# Patient Record
Sex: FEMALE | Race: OTHER | HISPANIC OR LATINO
[De-identification: names, ages, dates, MRNs, and addresses within clinical notes are randomized per-mention and may not be internally consistent; named-entity substitution may affect disease eponyms.]

---

## 2017-01-19 ENCOUNTER — APPOINTMENT (OUTPATIENT)
Dept: PULMONOLOGY | Facility: CLINIC | Age: 42
End: 2017-01-19

## 2017-01-20 ENCOUNTER — APPOINTMENT (OUTPATIENT)
Dept: PULMONOLOGY | Facility: CLINIC | Age: 42
End: 2017-01-20

## 2017-01-20 VITALS
SYSTOLIC BLOOD PRESSURE: 130 MMHG | DIASTOLIC BLOOD PRESSURE: 80 MMHG | BODY MASS INDEX: 33.46 KG/M2 | HEART RATE: 76 BPM | HEIGHT: 64 IN | WEIGHT: 196 LBS | OXYGEN SATURATION: 97 %

## 2017-01-20 DIAGNOSIS — J30.9 ALLERGIC RHINITIS, UNSPECIFIED: ICD-10-CM

## 2017-06-06 ENCOUNTER — APPOINTMENT (OUTPATIENT)
Dept: PULMONOLOGY | Facility: CLINIC | Age: 42
End: 2017-06-06

## 2017-12-29 ENCOUNTER — CHART COPY (OUTPATIENT)
Age: 42
End: 2017-12-29

## 2018-09-27 ENCOUNTER — APPOINTMENT (OUTPATIENT)
Dept: PULMONOLOGY | Facility: CLINIC | Age: 43
End: 2018-09-27
Payer: COMMERCIAL

## 2018-09-27 VITALS
BODY MASS INDEX: 30.55 KG/M2 | OXYGEN SATURATION: 99 % | DIASTOLIC BLOOD PRESSURE: 80 MMHG | SYSTOLIC BLOOD PRESSURE: 120 MMHG | WEIGHT: 178 LBS | HEART RATE: 80 BPM

## 2018-09-27 PROCEDURE — 94664 DEMO&/EVAL PT USE INHALER: CPT | Mod: 59

## 2018-09-27 PROCEDURE — 99215 OFFICE O/P EST HI 40 MIN: CPT | Mod: 25

## 2018-09-27 PROCEDURE — 94060 EVALUATION OF WHEEZING: CPT

## 2018-11-12 ENCOUNTER — APPOINTMENT (OUTPATIENT)
Dept: PULMONOLOGY | Facility: CLINIC | Age: 43
End: 2018-11-12
Payer: COMMERCIAL

## 2018-11-12 VITALS
HEART RATE: 73 BPM | HEIGHT: 64 IN | OXYGEN SATURATION: 99 % | BODY MASS INDEX: 30.39 KG/M2 | DIASTOLIC BLOOD PRESSURE: 70 MMHG | WEIGHT: 178 LBS | SYSTOLIC BLOOD PRESSURE: 130 MMHG

## 2018-11-12 PROCEDURE — 99214 OFFICE O/P EST MOD 30 MIN: CPT

## 2019-03-25 ENCOUNTER — APPOINTMENT (OUTPATIENT)
Dept: PULMONOLOGY | Facility: CLINIC | Age: 44
End: 2019-03-25

## 2019-05-14 ENCOUNTER — TRANSCRIPTION ENCOUNTER (OUTPATIENT)
Age: 44
End: 2019-05-14

## 2019-05-14 ENCOUNTER — OUTPATIENT (OUTPATIENT)
Dept: OUTPATIENT SERVICES | Facility: HOSPITAL | Age: 44
LOS: 1 days | End: 2019-05-14
Payer: COMMERCIAL

## 2019-05-14 VITALS
SYSTOLIC BLOOD PRESSURE: 140 MMHG | RESPIRATION RATE: 15 BRPM | HEART RATE: 65 BPM | DIASTOLIC BLOOD PRESSURE: 67 MMHG | OXYGEN SATURATION: 98 %

## 2019-05-14 VITALS
RESPIRATION RATE: 16 BRPM | HEIGHT: 63 IN | DIASTOLIC BLOOD PRESSURE: 60 MMHG | SYSTOLIC BLOOD PRESSURE: 124 MMHG | TEMPERATURE: 98 F | OXYGEN SATURATION: 98 % | WEIGHT: 179.9 LBS | HEART RATE: 69 BPM

## 2019-05-14 DIAGNOSIS — Z98.891 HISTORY OF UTERINE SCAR FROM PREVIOUS SURGERY: Chronic | ICD-10-CM

## 2019-05-14 DIAGNOSIS — R94.39 ABNORMAL RESULT OF OTHER CARDIOVASCULAR FUNCTION STUDY: ICD-10-CM

## 2019-05-14 DIAGNOSIS — Z98.890 OTHER SPECIFIED POSTPROCEDURAL STATES: Chronic | ICD-10-CM

## 2019-05-14 LAB
ANION GAP SERPL CALC-SCNC: 12 MMOL/L — SIGNIFICANT CHANGE UP (ref 5–17)
APTT BLD: 28.2 SEC — SIGNIFICANT CHANGE UP (ref 27.5–36.3)
BUN SERPL-MCNC: 9 MG/DL — SIGNIFICANT CHANGE UP (ref 8–20)
CALCIUM SERPL-MCNC: 9.1 MG/DL — SIGNIFICANT CHANGE UP (ref 8.6–10.2)
CHLORIDE SERPL-SCNC: 104 MMOL/L — SIGNIFICANT CHANGE UP (ref 98–107)
CO2 SERPL-SCNC: 24 MMOL/L — SIGNIFICANT CHANGE UP (ref 22–29)
CREAT SERPL-MCNC: 0.51 MG/DL — SIGNIFICANT CHANGE UP (ref 0.5–1.3)
GLUCOSE SERPL-MCNC: 111 MG/DL — SIGNIFICANT CHANGE UP (ref 70–115)
HCG SERPL-ACNC: <4 MIU/ML — SIGNIFICANT CHANGE UP
HCT VFR BLD CALC: 39.5 % — SIGNIFICANT CHANGE UP (ref 37–47)
HGB BLD-MCNC: 12.9 G/DL — SIGNIFICANT CHANGE UP (ref 12–16)
INR BLD: 0.98 RATIO — SIGNIFICANT CHANGE UP (ref 0.88–1.16)
MCHC RBC-ENTMCNC: 28.5 PG — SIGNIFICANT CHANGE UP (ref 27–31)
MCHC RBC-ENTMCNC: 32.7 G/DL — SIGNIFICANT CHANGE UP (ref 32–36)
MCV RBC AUTO: 87.4 FL — SIGNIFICANT CHANGE UP (ref 81–99)
PLATELET # BLD AUTO: 212 K/UL — SIGNIFICANT CHANGE UP (ref 150–400)
POTASSIUM SERPL-MCNC: 4 MMOL/L — SIGNIFICANT CHANGE UP (ref 3.5–5.3)
POTASSIUM SERPL-SCNC: 4 MMOL/L — SIGNIFICANT CHANGE UP (ref 3.5–5.3)
PROTHROM AB SERPL-ACNC: 11.3 SEC — SIGNIFICANT CHANGE UP (ref 10–12.9)
RBC # BLD: 4.52 M/UL — SIGNIFICANT CHANGE UP (ref 4.4–5.2)
RBC # FLD: 12.5 % — SIGNIFICANT CHANGE UP (ref 11–15.6)
SODIUM SERPL-SCNC: 140 MMOL/L — SIGNIFICANT CHANGE UP (ref 135–145)
WBC # BLD: 3 K/UL — LOW (ref 4.8–10.8)
WBC # FLD AUTO: 3 K/UL — LOW (ref 4.8–10.8)

## 2019-05-14 PROCEDURE — 36415 COLL VENOUS BLD VENIPUNCTURE: CPT

## 2019-05-14 PROCEDURE — C1894: CPT

## 2019-05-14 PROCEDURE — C1760: CPT

## 2019-05-14 PROCEDURE — C1887: CPT

## 2019-05-14 PROCEDURE — 80048 BASIC METABOLIC PNL TOTAL CA: CPT

## 2019-05-14 PROCEDURE — 84702 CHORIONIC GONADOTROPIN TEST: CPT

## 2019-05-14 PROCEDURE — 93458 L HRT ARTERY/VENTRICLE ANGIO: CPT

## 2019-05-14 PROCEDURE — 93005 ELECTROCARDIOGRAM TRACING: CPT

## 2019-05-14 PROCEDURE — 85610 PROTHROMBIN TIME: CPT

## 2019-05-14 PROCEDURE — 99152 MOD SED SAME PHYS/QHP 5/>YRS: CPT

## 2019-05-14 PROCEDURE — 99153 MOD SED SAME PHYS/QHP EA: CPT

## 2019-05-14 PROCEDURE — 85027 COMPLETE CBC AUTOMATED: CPT

## 2019-05-14 PROCEDURE — 93010 ELECTROCARDIOGRAM REPORT: CPT

## 2019-05-14 PROCEDURE — 85730 THROMBOPLASTIN TIME PARTIAL: CPT

## 2019-05-14 PROCEDURE — C1769: CPT

## 2019-05-14 RX ORDER — SODIUM CHLORIDE 9 MG/ML
1000 INJECTION INTRAMUSCULAR; INTRAVENOUS; SUBCUTANEOUS
Refills: 0 | Status: DISCONTINUED | OUTPATIENT
Start: 2019-05-14 | End: 2019-05-29

## 2019-05-14 RX ORDER — TOPIRAMATE 25 MG
0 TABLET ORAL
Qty: 0 | Refills: 0 | DISCHARGE

## 2019-05-14 RX ORDER — ESCITALOPRAM OXALATE 10 MG/1
0 TABLET, FILM COATED ORAL
Qty: 0 | Refills: 0 | DISCHARGE

## 2019-05-14 RX ORDER — ASPIRIN/CALCIUM CARB/MAGNESIUM 324 MG
81 TABLET ORAL ONCE
Refills: 0 | Status: COMPLETED | OUTPATIENT
Start: 2019-05-14 | End: 2019-05-14

## 2019-05-14 RX ORDER — ACETAMINOPHEN 500 MG
1000 TABLET ORAL ONCE
Refills: 0 | Status: COMPLETED | OUTPATIENT
Start: 2019-05-14 | End: 2019-05-14

## 2019-05-14 RX ADMIN — Medication 1000 MILLIGRAM(S): at 16:10

## 2019-05-14 RX ADMIN — Medication 81 MILLIGRAM(S): at 13:54

## 2019-05-14 RX ADMIN — SODIUM CHLORIDE 30 MILLILITER(S): 9 INJECTION INTRAMUSCULAR; INTRAVENOUS; SUBCUTANEOUS at 10:54

## 2019-05-14 RX ADMIN — Medication 400 MILLIGRAM(S): at 15:34

## 2019-05-14 NOTE — H&P PST ADULT - HISTORY OF PRESENT ILLNESS
Ms Valentino is a 44 yo Guatemalan speaking female who has been complaining of recurrent pa;pitations.  Her palpitations occur 3 times per week and last for a few seconds up to 3 times per day.  Patient's palpitations are associated with symptoms of left sided chest pain.  She has an additional complaint of chest pressure like sensation that localizes to left chest.  Chest pain is at rest and with exertion.  Nuclear Stress test 2/26/19 showed small mild basal anterior wall ischemia, with no  antecedant myocardial infarction.  EF 79%.  ECHO 2/7/19 ef 65%  MILD mr, TRACE tr.  Holter monitor HR  Moderate/frequent APC's. SLeep study 2/7/19 Mild to moderate RONDA with minimal hypoxia.

## 2019-05-14 NOTE — DISCHARGE NOTE NURSING/CASE MANAGEMENT/SOCIAL WORK - NSDCDPATPORTLINK_GEN_ALL_CORE
You can access the [facility] Patient Portal, offered by [facility], by registering with the following website: [email or web address]

## 2019-05-14 NOTE — DISCHARGE NOTE PROVIDER - NSDCACTIVITY_GEN_ALL_CORE
Do not make important decisions/Stairs allowed/Walking - Indoors allowed/Showering allowed/Do not drive or operate machinery/Walking - Outdoors allowed/Sex allowed

## 2019-05-14 NOTE — CONSULT NOTE ADULT - SUBJECTIVE AND OBJECTIVE BOX
Patient is a 43y old  Female who presents with a chief complaint of Chest pain      HPI:  Ms Valentino is a 42 yo Chadian speaking female who has been complaining of recurrent palpitations.  Her palpitations occur 3 times per week and last for a few seconds up to 3 times per day.  Patient's palpitations are associated with symptoms of left sided chest pain.  She has an additional complaint of chest pressure like sensation that localizes to left chest.  Chest pain is at rest and with exertion.  Nuclear Stress test 19 showed small mild basal anterior wall ischemia, with no  antecedant myocardial infarction.  EF 79%.  ECHO 19 ef 65%  MILD mr, TRACE tr.  Holter monitor HR  Moderate/frequent APC's. SLeep study 19 Mild to moderate RONDA with minimal hypoxia.       PAST MEDICAL & SURGICAL HISTORY:  PFO (patent foramen ovale): diagnosed   RONDA (obstructive sleep apnea): mild to moderate  Hypertension  Asthma  Paroxysmal atrial tachycardia  Paroxysmal SVT (supraventricular tachycardia)  Atypical chest pain  Palpitations  H/O knee surgery: left knee  H/O: : X 2        Allergies    No Known Allergies    Intolerances        MEDICATIONS  (STANDING):  Lexapro  Topamax    FAMILY HISTORY:      SOCIAL HISTORY:    CIGARETTES:  never    ALCOHOL:  Rare    REVIEW OF SYSTEMS:  CONSTITUTIONAL: No fever, weight loss, or fatigue  EYES: No eye pain, visual disturbances, or discharge  ENMT:  No difficulty hearing, tinnitus, vertigo; No sinus or throat pain  NECK: No pain or stiffness  RESPIRATORY: No cough, wheezing, chills or hemoptysis; No Shortness of Breath  CARDIOVASCULAR: No chest pain, palpitations, passing out, dizziness, or leg swelling  GASTROINTESTINAL: No abdominal or epigastric pain. No nausea, vomiting, or hematemesis; No diarrhea or constipation. No melena or hematochezia.  GENITOURINARY: No dysuria, frequency, hematuria, or incontinence  NEUROLOGICAL: No headaches, memory loss, loss of strength, numbness, or tremors  SKIN: No itching, burning, rashes, or lesions   LYMPH Nodes: No enlarged glands  ENDOCRINE: No heat or cold intolerance; No hair loss  MUSCULOSKELETAL: No joint pain or swelling; No muscle, back, or extremity pain  PSYCHIATRIC: No depression, anxiety, mood swings, or difficulty sleeping  HEME/LYMPH: No easy bruising, or bleeding gums  ALLERY AND IMMUNOLOGIC: No hives or eczema	    Vital Signs Last 24 Hrs  T(C): 36.8 (14 May 2019 08:59), Max: 36.8 (14 May 2019 08:59)  T(F): 98.2 (14 May 2019 08:59), Max: 98.2 (14 May 2019 08:59)  HR: 65 (14 May 2019 15:43) (58 - 69)  BP: 140/67 (14 May 2019 15:43) (124/60 - 140/67)  BP(mean): --  RR: 15 (14 May 2019 15:43) (14 - 19)  SpO2: 98% (14 May 2019 15:43) (97% - 98%)    Daily Height in cm: 160.02 (14 May 2019 08:59)    Daily     I&O's Detail      PHYSICAL EXAM:  Appearance: Normal, well nourished	  HEENT:   Normal oral mucosa, PERRL, EOMI, sclera non-icteric	  Lymphatic: No cervical lymphadenopathy  Cardiovascular: Normal S1 S2, No JVD, No cardiac murmurs, No carotid bruits, No peripheral edema  Respiratory: Lungs clear to auscultation	  Psychiatry: A & O x 3, Mood & affect appropriate  Gastrointestinal:  Soft, Non-tender, + BS, no bruits	  Skin: No rashes, No ecchymoses, No cyanosis  Neurologic: Grossly non-focal with full strength in all four extremities  Extremities: Normal range of motion, No clubbing, cyanosis or edema  Vascular: Peripheral pulses palpable 2+ bilaterally      LABS:                        12.9   3.0   )-----------( 212      ( 14 May 2019 09:36 )             39.5     05-14    140  |  104  |  9.0  ----------------------------<  111  4.0   |  24.0  |  0.51    Ca    9.1      14 May 2019 09:36          PT/INR - ( 14 May 2019 09:36 )   PT: 11.3 sec;   INR: 0.98 ratio         PTT - ( 14 May 2019 09:36 )  PTT:28.2 sec    I&O's Summary    BNP  RADIOLOGY & ADDITIONAL STUDIES:    Assessment:  Ms Valentino is a 42 yo Chadian speaking female who has been complaining of recurrent palpitations.  Her palpitations occur 3 times per week and last for a few seconds up to 3 times per day.  Patient's palpitations are associated with symptoms of left sided chest pain.  She has an additional complaint of chest pressure like sensation that localizes to left chest.  Chest pain is at rest and with exertion.  Nuclear Stress test 19 showed small mild basal anterior wall ischemia, with no  antecedant myocardial infarction.  EF 79%.  ECHO 19 ef 65%  MILD mr, TRACE tr.  Holter monitor HR  Moderate/frequent APC's. SLeep study 19 Mild to moderate RONDA with minimal hypoxia.     Plan:  Cardiac catheterization and possible percutaneous intervention recommended.  Risks, benefits, and alternatives reviewed.  Risks including but not limited to MI, death, stroke, bleeding, infection, vessel injury, hematoma, renal failure, allergic reaction, urgent open heart surgery, restenosis and stent thrombosis were reviewed.  All questions answered.  Patient is agreeable to proceed.

## 2019-05-14 NOTE — H&P PST ADULT - ADDITIONAL PE
ASA 2  Mallampati 2  BRANSR nl axis no ectopy ASA 2  Mallampati 2  BRA ASA 2  Mallampati 2  BRA 1.0%

## 2019-05-14 NOTE — H&P PST ADULT - NSICDXPASTMEDICALHX_GEN_ALL_CORE_FT
PAST MEDICAL HISTORY:  Asthma     Atypical chest pain     Hypertension     RONDA (obstructive sleep apnea) mild to moderate    Palpitations     Paroxysmal atrial tachycardia     Paroxysmal SVT (supraventricular tachycardia)     PFO (patent foramen ovale) diagnosed 2012

## 2019-05-14 NOTE — DISCHARGE NOTE PROVIDER - HOSPITAL COURSE
Ms Valentino is a 44 yo Georgian speaking female who has been complaining of recurrent pa;pitations.  Her palpitations occur 3 times per week and last for a few seconds up to 3 times per day.  Patient's palpitations are associated with symptoms of left sided chest pain.  She has an additional complaint of chest pressure like sensation that localizes to left chest.  Chest pain is at rest and with exertion.  Nuclear Stress test 2/26/19 showed small mild basal anterior wall ischemia, with no  antecedant myocardial infarction.  EF 79%.  ECHO 2/7/19 ef 65%  MILD mr, TRACE tr.  Holter monitor HR  Moderate/frequent APC's. SLeep study 2/7/19 Mild to moderate RONDA with minimal hypoxia.        S/P cath non obstructive CAD    Plan medical management    FOllow up with Dr Ventura    Continue medications as prior    Bedrest until 330 the d/c after 4 pm if seen by and ok with Dr Gibson.

## 2019-05-14 NOTE — DISCHARGE NOTE PROVIDER - CARE PROVIDER_API CALL
Ellen Ventura)  Cardiac Electrophysiology; Cardiovascular Disease; Internal Medicine  [address] [place] [address]  Phone: [phone]  Fax: [phone]  Follow Up Time: 2 weeks

## 2019-05-14 NOTE — H&P PST ADULT - NSICDXPROBLEM_GEN_ALL_CORE_FT
44 yo Nepali speaking female with palpitations and chest pain and abnormal nuclear stress test, patient presents today for PST for cardiac cath to r/o CAD.

## 2019-12-06 PROBLEM — I47.1 SUPRAVENTRICULAR TACHYCARDIA: Chronic | Status: ACTIVE | Noted: 2019-05-14

## 2019-12-06 PROBLEM — R07.89 OTHER CHEST PAIN: Chronic | Status: ACTIVE | Noted: 2019-05-14

## 2019-12-06 PROBLEM — Q21.1 ATRIAL SEPTAL DEFECT: Chronic | Status: ACTIVE | Noted: 2019-05-14

## 2019-12-06 PROBLEM — G47.33 OBSTRUCTIVE SLEEP APNEA (ADULT) (PEDIATRIC): Chronic | Status: ACTIVE | Noted: 2019-05-14

## 2019-12-06 PROBLEM — J45.909 UNSPECIFIED ASTHMA, UNCOMPLICATED: Chronic | Status: ACTIVE | Noted: 2019-05-14

## 2019-12-06 PROBLEM — I10 ESSENTIAL (PRIMARY) HYPERTENSION: Chronic | Status: ACTIVE | Noted: 2019-05-14

## 2019-12-06 PROBLEM — R00.2 PALPITATIONS: Chronic | Status: ACTIVE | Noted: 2019-05-14

## 2019-12-06 RX ORDER — MONTELUKAST 10 MG/1
10 TABLET, FILM COATED ORAL
Qty: 30 | Refills: 5 | Status: DISCONTINUED | COMMUNITY
Start: 2018-09-27 | End: 2019-12-06

## 2019-12-06 RX ORDER — FLUTICASONE PROPIONATE AND SALMETEROL 50; 250 UG/1; UG/1
250-50 POWDER RESPIRATORY (INHALATION)
Qty: 1 | Refills: 5 | Status: DISCONTINUED | COMMUNITY
Start: 2018-09-27 | End: 2019-12-06

## 2019-12-06 RX ORDER — ALBUTEROL SULFATE 90 UG/1
108 (90 BASE) AEROSOL, METERED RESPIRATORY (INHALATION)
Qty: 1 | Refills: 5 | Status: DISCONTINUED | COMMUNITY
Start: 2018-09-27 | End: 2019-12-06

## 2019-12-20 ENCOUNTER — RX CHANGE (OUTPATIENT)
Age: 44
End: 2019-12-20

## 2020-01-09 ENCOUNTER — MEDICATION RENEWAL (OUTPATIENT)
Age: 45
End: 2020-01-09

## 2020-02-06 ENCOUNTER — APPOINTMENT (OUTPATIENT)
Dept: PULMONOLOGY | Facility: CLINIC | Age: 45
End: 2020-02-06
Payer: COMMERCIAL

## 2020-02-06 VITALS
OXYGEN SATURATION: 98 % | HEART RATE: 85 BPM | HEIGHT: 64.25 IN | SYSTOLIC BLOOD PRESSURE: 118 MMHG | WEIGHT: 190 LBS | BODY MASS INDEX: 32.44 KG/M2 | DIASTOLIC BLOOD PRESSURE: 80 MMHG

## 2020-02-06 PROCEDURE — 94664 DEMO&/EVAL PT USE INHALER: CPT | Mod: 59

## 2020-02-06 PROCEDURE — 94060 EVALUATION OF WHEEZING: CPT

## 2020-02-06 PROCEDURE — 99215 OFFICE O/P EST HI 40 MIN: CPT | Mod: 25

## 2020-02-06 NOTE — CONSULT LETTER
[Dear  ___] : Dear  [unfilled], [Consult Letter:] : I had the pleasure of evaluating your patient, [unfilled]. [Please see my note below.] : Please see my note below. [Sincerely,] : Sincerely, [FreeTextEntry3] : Georges Haas DO [facility]\par Pulmonary Critical Care\par Director Pulmonary Division\par Medical Director Respiratory Therapy\par [facility]\par \par

## 2020-02-06 NOTE — HISTORY OF PRESENT ILLNESS
[FreeTextEntry1] : no sig dyspnea\par no fever, chill, chest pain\par no sputum\par  complaining of snoring\par she has gained weight , fatigue noted\par ran out asthma medications

## 2020-02-06 NOTE — PHYSICAL EXAM
[Normal Appearance] : normal appearance [General Appearance - Well Developed] : well developed [General Appearance - Well Nourished] : well nourished [No Deformities] : no deformities [Normal Conjunctiva] : the conjunctiva exhibited no abnormalities [Erythema] : erythema of the pharynx [II] : II [Heart Sounds] : normal S1 and S2 [Heart Rate And Rhythm] : heart rate and rhythm were normal [Murmurs] : no murmurs present [Respiration, Rhythm And Depth] : normal respiratory rhythm and effort [Exaggerated Use Of Accessory Muscles For Inspiration] : no accessory muscle use [Abnormal Walk] : normal gait [] : no rash [No Focal Deficits] : no focal deficits [Oriented To Time, Place, And Person] : oriented to person, place, and time [Impaired Insight] : insight and judgment were intact [Memory Recent] : recent memory was not impaired [Affect] : the affect was normal [Nail Clubbing] : no clubbing of the fingernails [Cyanosis, Localized] : no localized cyanosis [FreeTextEntry1] : no chest wall abn

## 2020-02-06 NOTE — ASSESSMENT
[FreeTextEntry1] : Obesity, clinical RONDA, recent weight gain, heavy snoring and fatigue\par Needs repeat sleep study, will order\par asthma appears close to baseline, spirometry remains normal, borderline reversible component\par Advair, albuterol , nebs, prn rescue re ordered\par action plan reviewed\par Never smoker\par 6 months or sooner if needed\par Sleep MD follow up post study

## 2020-02-06 NOTE — REVIEW OF SYSTEMS
[As Noted in HPI] : as noted in HPI [Indigestion] : indigestion [Negative] : Psychiatric [Heartburn] : no heartburn

## 2020-05-11 ENCOUNTER — APPOINTMENT (OUTPATIENT)
Dept: PULMONOLOGY | Facility: CLINIC | Age: 45
End: 2020-05-11
Payer: COMMERCIAL

## 2020-05-11 DIAGNOSIS — R06.83 SNORING: ICD-10-CM

## 2020-05-11 PROCEDURE — 99213 OFFICE O/P EST LOW 20 MIN: CPT | Mod: 95

## 2020-05-11 NOTE — ASSESSMENT
[FreeTextEntry1] : Pt without clinically significant RONDA.  Advised keeping her wt down.  Return for retest if sx worsen

## 2020-05-11 NOTE — HISTORY OF PRESENT ILLNESS
[Snoring] : snoring [TextBox_100] : 4/20 [TextBox_108] : 4.2 [TextBox_116] : 71 [TextBox_112] : 97 [TextBox_165] : I reviewed the patient's sleep study with the patient.\par

## 2020-08-10 ENCOUNTER — APPOINTMENT (OUTPATIENT)
Dept: PULMONOLOGY | Facility: CLINIC | Age: 45
End: 2020-08-10

## 2021-02-04 ENCOUNTER — TRANSCRIPTION ENCOUNTER (OUTPATIENT)
Age: 46
End: 2021-02-04

## 2021-02-16 ENCOUNTER — RX RENEWAL (OUTPATIENT)
Age: 46
End: 2021-02-16

## 2021-03-01 ENCOUNTER — APPOINTMENT (OUTPATIENT)
Dept: PULMONOLOGY | Facility: CLINIC | Age: 46
End: 2021-03-01
Payer: COMMERCIAL

## 2021-03-01 VITALS
OXYGEN SATURATION: 98 % | WEIGHT: 190 LBS | TEMPERATURE: 97.3 F | SYSTOLIC BLOOD PRESSURE: 140 MMHG | HEART RATE: 80 BPM | DIASTOLIC BLOOD PRESSURE: 70 MMHG | BODY MASS INDEX: 32.36 KG/M2

## 2021-03-01 PROCEDURE — 99072 ADDL SUPL MATRL&STAF TM PHE: CPT

## 2021-03-01 PROCEDURE — 99213 OFFICE O/P EST LOW 20 MIN: CPT

## 2021-03-21 ENCOUNTER — APPOINTMENT (OUTPATIENT)
Dept: DISASTER EMERGENCY | Facility: CLINIC | Age: 46
End: 2021-03-21

## 2021-03-22 LAB — SARS-COV-2 N GENE NPH QL NAA+PROBE: NOT DETECTED

## 2021-03-25 ENCOUNTER — APPOINTMENT (OUTPATIENT)
Dept: PULMONOLOGY | Facility: CLINIC | Age: 46
End: 2021-03-25
Payer: COMMERCIAL

## 2021-03-25 VITALS — BODY MASS INDEX: 30.73 KG/M2 | WEIGHT: 180 LBS | HEIGHT: 64 IN | TEMPERATURE: 98.6 F

## 2021-03-25 PROCEDURE — 99072 ADDL SUPL MATRL&STAF TM PHE: CPT

## 2021-03-25 PROCEDURE — 94010 BREATHING CAPACITY TEST: CPT

## 2021-03-25 NOTE — CONSULT LETTER
[Dear  ___] : Dear  [unfilled], [Consult Letter:] : I had the pleasure of evaluating your patient, [unfilled]. [Please see my note below.] : Please see my note below. [Sincerely,] : Sincerely, [FreeTextEntry3] : [facility]es [facility]as DO [facility]\par Pulmonary Critical Care\par Director Pulmonary Division\par Medical Director Respiratory Therapy\par [facility]\par \par

## 2021-03-25 NOTE — PHYSICAL EXAM
[General Appearance - Well Developed] : well developed [Normal Appearance] : normal appearance [General Appearance - Well Nourished] : well nourished [No Deformities] : no deformities [Normal Conjunctiva] : the conjunctiva exhibited no abnormalities [Erythema] : erythema of the pharynx [II] : II [Heart Rate And Rhythm] : heart rate and rhythm were normal [Heart Sounds] : normal S1 and S2 [Murmurs] : no murmurs present [Respiration, Rhythm And Depth] : normal respiratory rhythm and effort [Exaggerated Use Of Accessory Muscles For Inspiration] : no accessory muscle use [Abnormal Walk] : normal gait [] : no rash [No Focal Deficits] : no focal deficits [Oriented To Time, Place, And Person] : oriented to person, place, and time [Impaired Insight] : insight and judgment were intact [Affect] : the affect was normal [Memory Recent] : recent memory was not impaired [Nail Clubbing] : no clubbing of the fingernails [Cyanosis, Localized] : no localized cyanosis [FreeTextEntry1] : no chest wall abn

## 2021-03-25 NOTE — DISCUSSION/SUMMARY
[FreeTextEntry1] : Obesity, mild BMI 32, no significant RONDA, but  GERD noted\par asthma by hx, last spirometry normal,  using Advair daily, lung exam is normal, rare rescue\par Never smoker\par will repeat CXR, PFTS\par discussed weight loss, will refer to medical bariatrics\par Pepcid q HS , GI referral information given, keep HOB elevated, avoid, coffee, caffeine, mints, chocolate\par 6 months or sooner if needed, will call with above results\par \par

## 2021-03-25 NOTE — HISTORY OF PRESENT ILLNESS
[FreeTextEntry1] : no sig dyspnea\par no fever, chill, chest pain\par no sputum\par has significant GERD nocturnally\par using Advair daily, rare rescue\par had sleep study, saw LE- ( AHI 4), no Rx indicated

## 2021-04-13 ENCOUNTER — TRANSCRIPTION ENCOUNTER (OUTPATIENT)
Age: 46
End: 2021-04-13

## 2021-04-29 ENCOUNTER — APPOINTMENT (OUTPATIENT)
Dept: PHYSICAL MEDICINE AND REHAB | Facility: CLINIC | Age: 46
End: 2021-04-29
Payer: COMMERCIAL

## 2021-04-29 VITALS
HEART RATE: 82 BPM | RESPIRATION RATE: 14 BRPM | WEIGHT: 183 LBS | BODY MASS INDEX: 31.24 KG/M2 | TEMPERATURE: 98 F | SYSTOLIC BLOOD PRESSURE: 136 MMHG | DIASTOLIC BLOOD PRESSURE: 82 MMHG | HEIGHT: 64 IN | OXYGEN SATURATION: 99 %

## 2021-04-29 DIAGNOSIS — Z78.9 OTHER SPECIFIED HEALTH STATUS: ICD-10-CM

## 2021-04-29 DIAGNOSIS — Z87.09 PERSONAL HISTORY OF OTHER DISEASES OF THE RESPIRATORY SYSTEM: ICD-10-CM

## 2021-04-29 PROCEDURE — 99204 OFFICE O/P NEW MOD 45 MIN: CPT

## 2021-04-29 PROCEDURE — 99072 ADDL SUPL MATRL&STAF TM PHE: CPT

## 2021-04-29 RX ORDER — PREDNISONE 10 MG/1
10 TABLET ORAL
Qty: 30 | Refills: 4 | Status: DISCONTINUED | COMMUNITY
Start: 2020-02-06 | End: 2021-04-29

## 2021-04-29 RX ORDER — ALBUTEROL SULFATE 90 UG/1
108 (90 BASE) AEROSOL, METERED RESPIRATORY (INHALATION)
Qty: 1 | Refills: 5 | Status: DISCONTINUED | COMMUNITY
Start: 2020-02-06 | End: 2021-04-29

## 2021-04-29 RX ORDER — NAPROXEN 500 MG/1
500 TABLET ORAL
Qty: 28 | Refills: 0 | Status: ACTIVE | COMMUNITY
Start: 2021-04-29 | End: 1900-01-01

## 2021-04-29 RX ORDER — FLUTICASONE PROPIONATE AND SALMETEROL 250; 50 UG/1; UG/1
250-50 POWDER RESPIRATORY (INHALATION)
Qty: 1 | Refills: 5 | Status: DISCONTINUED | COMMUNITY
Start: 2020-02-06 | End: 2021-04-29

## 2021-04-29 RX ORDER — FLUTICASONE PROPIONATE AND SALMETEROL 50; 250 UG/1; UG/1
250-50 POWDER RESPIRATORY (INHALATION) TWICE DAILY
Qty: 1 | Refills: 0 | Status: DISCONTINUED | COMMUNITY
Start: 2019-12-20 | End: 2021-04-29

## 2021-04-29 RX ORDER — CYCLOBENZAPRINE HYDROCHLORIDE 5 MG/1
5 TABLET, FILM COATED ORAL
Qty: 30 | Refills: 1 | Status: ACTIVE | COMMUNITY
Start: 2021-04-29 | End: 1900-01-01

## 2021-04-30 NOTE — PHYSICAL EXAM
[FreeTextEntry1] : PE:\par Constitutional: In NAD, calm and cooperative\par HEENT: NCAT, Anicteric sclera, no lid-lag\par Cardio: Extremities appear pink and well perfused, no peripheral edema\par Respiratory: Normal respiratory effort on room air, no accessory muscle use\par Skin: no rashes seen on exposed skin, no visible abrasions\par Psych: Normal affect, intact judgment and insight\par Neuro: Awake, alert and oriented x 3, see below for focused neurological exam\par MSK (Back)\par 	Inspection: no gross swelling identified\par 	Palpation: Tenderness of the bilateral lower lumbar paraspinals\par 	ROM: Minimal Pain at end lumbar extension/flexion\par 	Strength: 5/5 strength in bilateral lower extremities\par 	Reflexes: 2+ Patella reflex bilaterally, 2+ Achilles reflex bilaterally, negative clonus bilaterally\par 	Sensation: Intact to light touch in bilateral lower extremities\par Special tests:\par SLR:negative bilaterally\par JESSIKA:negative bilaterally\par FADIR: negative bilaterally\par Facet loading:

## 2021-04-30 NOTE — HISTORY OF PRESENT ILLNESS
[FreeTextEntry1] : Ms. SUZANNE BACH is a 45 year old  female who presents with low back pain. \par \par Location:Lower thoracic-upper lumbar region\par Onset:A few weeks ago, worsened, no specific triggering event. Went to Urgent Care who gave her some muscle relaxers/naproxen. \par Provocation/Palliative:Worse with activity, better somewhat with rest\par Quality:Achiness\par Radiation:No radiation into legs\par Severity:8/10\par Timing:Not improving with time\par \par Denies any associated numbness. Denies any associated leg weakness. Denies any loss of bowel/bladder control or any groin numbness.\par Previous medications trialed:Cyclobenzaprine, Naproxen\par Previous procedures relevant to complaint:None\par Conservative therapy tried?:Nothing recently

## 2021-04-30 NOTE — REASON FOR VISIT
[Initial Evaluation] : an initial evaluation [[employer] ] : provided by [employer]   [FreeTextEntry1] : [id number]

## 2021-05-04 ENCOUNTER — RX RENEWAL (OUTPATIENT)
Age: 46
End: 2021-05-04

## 2021-05-05 ENCOUNTER — TRANSCRIPTION ENCOUNTER (OUTPATIENT)
Age: 46
End: 2021-05-05

## 2021-05-27 ENCOUNTER — APPOINTMENT (OUTPATIENT)
Dept: PHYSICAL MEDICINE AND REHAB | Facility: CLINIC | Age: 46
End: 2021-05-27

## 2021-05-29 ENCOUNTER — TRANSCRIPTION ENCOUNTER (OUTPATIENT)
Age: 46
End: 2021-05-29

## 2021-07-08 NOTE — H&P PST ADULT - URINARY CATHETER
Patient: Violeta Causey                        PROCEDURE: Right Carpal Tunnel Release    PRE OPERATIVE INSTRUCTIONS:  Five (5) days prior to surgery STOP taking any hormonal medications, aspirin, fish oil and/or anti-inflammatory medications. If you are taking blood thinner medication (such as Coumadin, Plavix, Heparin or others) you will need special instructions from the prescribing physician. LABS: Report to [facility] on 8/4/21 between 1:00pm-3:45pm for COVID testing. HISTORY & PHYSICAL  appointment is scheduled with  Dr. Chan Brown              on 7/29/2021 @ 2:15pm at the [facility] office. Surgery Date: 8/9/2021  Time: 12:30pm  Report to Paula Mack on the First Floor Admitting at: 11:00am    THE DAY OF SURGERY:         1. Do not eat, chew gum or drink anything after midnight prior to the date of your surgery. 2. Take your blood pressure and/or heart medications with small sips of water unless otherwise instructed. If you are insulin dependent, bring your insulin with you, unless otherwise instructed. 3. Bring a list of your medications and the dosage to the hospital including  vitamins. 4. Do not wear nail polish, make-up, jewelry, perfumes or skin creams. 5. Do not bring valuables or money to the hospital.        6. You MUST have a responsible adult arranged to drive you home following surgery. Please have good contact information available to give the hospital. It is recommended that they stay with you 24 hours after surgery. Post op visit  appointment is scheduled with Dr. Chan Brown       on 8/23/2021 @ 10:15am at the [facility] office.       Santana Enciso, Surgery Scheduler  [phone] no

## 2021-07-26 ENCOUNTER — APPOINTMENT (OUTPATIENT)
Dept: PHYSICAL MEDICINE AND REHAB | Facility: CLINIC | Age: 46
End: 2021-07-26
Payer: MEDICAID

## 2021-07-26 VITALS
WEIGHT: 179 LBS | SYSTOLIC BLOOD PRESSURE: 131 MMHG | DIASTOLIC BLOOD PRESSURE: 84 MMHG | HEIGHT: 64 IN | BODY MASS INDEX: 30.56 KG/M2 | HEART RATE: 90 BPM | TEMPERATURE: 98 F | RESPIRATION RATE: 14 BRPM

## 2021-07-26 PROCEDURE — 99214 OFFICE O/P EST MOD 30 MIN: CPT

## 2021-07-28 NOTE — DATA REVIEWED
[FreeTextEntry1] : PATIENT NAME: Susan Valentinopar PATIENT PHONE NUMBER:\par PATIENT ID: [id number]\par : 1975\par DATE OF EXAM: 07/15/2021\par R. Phys. Name: Kishor Wood\par R. Phys. Address: [address] [place] [address]\par R. Phys. Phone: [phone]\par THORACIC SPINE XRAY AP AND LATERAL\par \par HISTORY: M54.9 Dorsalgia\par \par TECHNIQUE: AP and lateral views of the thoracic spine were obtained.\par \par FINDINGS: The examination reveals normal thoracic vertebrae. There is\par osteophytic ridging along the vertebral body margins. There are no destructive\par abnormalities or fracture. The posterior elements and pedicles appear intact.\par There is no displacement of paraspinal lines.\par \par IMPRESSION:\par \par \par \par \par \par Mild degenerative changes. M51.34\par \par \par \par Signed by: Marcy Mckenzie MD\par Signed Date: 7/15/2021 3:16 PM EDT\par \par \par \par SIGNED BY: Marcy Mckenzie M.D., Ext. [id number] 07/15/2021 03:16 PM\par \par PATIENT NAME: Evans Valentino PATIENT PHONE NUMBER:\par PATIENT ID: [id number]\par : 1975\par DATE OF EXAM: 07/15/2021\par R. Phys. Name: Kishor Wood\par R. Phys. Address: [address] [place] [address]\par R. Phys. Phone: [phone]\par CERVICAL SPINE XRAY ( four view minimum)\par \par HISTORY: M54.2 Cervical/ Neck Pain\par \par AP, lateral, right and left oblique views and open-mouth views of the cervical\par spine are obtained.\par \par \par There is straightening of the normal cervical lordosis. The vertebral bodies are\par normal height. There is no evidence of a compression fracture. No osteoblastic\par or osteolytic lesions were identified. There is osteophytic ridging along\par vertebral margins C5-C6 and C6-C7. The disc spaces are intact. The visualized\par soft tissues reveal no abnormalities.\par \par IMPRESSION:\par Straightening of the normal cervical lordosis\par \par Mild degenerative change C5-C6 and C6-C7\par \par \par ICD 10 codes:\par \par \par \par Signed by: Marcy Mckenzie MD\par Signed Date: 7/15/2021 3:15 PM EDT\par \par \par \par SIGNED BY: Marcy Mckenzie M.D., Ext. [id number] 07/15/2021 03:15 PM

## 2021-07-28 NOTE — REASON FOR VISIT
[Follow-Up] : a follow-up visit [FreeTextEntry1] : [id number] [FreeTextEntry2] : [employer]  [TWNoteComboBox1] : [place]

## 2021-07-28 NOTE — HISTORY OF PRESENT ILLNESS
[FreeTextEntry1] : Ms. SUZANNE BACH is a 45 year old  female who presents for follow up. Since last visit, patient said he pain was more in her mid-upper back for which C-Spine and T-Spine X-rays were ordered. Patient is taking naproxen with only some relief. Patient currently in PT with some relief. \par \par Location:Entire Spine, worse in midback/R upper back lately, Now also over R shoulder\par Onset:Early 4/2021, worsened, no specific triggering event. Went to Urgent Care who gave her some muscle relaxers/naproxen. \par Provocation/Palliative:Worse with activity, better somewhat with rest\par Quality:Achiness\par Radiation:Now down R arm\par Severity:8/10\par Timing:Not improving with time\par \par \par No bowel/bladder changes. No groin numbness.

## 2021-07-28 NOTE — ASSESSMENT
[FreeTextEntry1] : Ms. SUZANNE BACH is a 45 year old female who presents with a few week history of low back pain, likely myofascial in nature, but possibly due to underlying spondylosis. Patient now complaining of pain in the T Spine and C Spine, as well as R shoulder. Denies any red flag signs. Will recommend:\par - PT 2-3x/week for stretching, strengthening (especially of core muscles), ROM exercises, HEP and modalities PRN including myofascial release, moist heat, and TENS therapy \par - Will order MRI C Spine and R Shoulder to evaluate for underlying pathology\par - Trial of Cymbalta 30mg Qhs x 1 week and then 60mg Qhs. Patient advised on potential side effects. \par - Can continue Aleve PRN\par \par RTC in 3-4 weeks. Patient educated on red flag signs including any changes to their bowel/bladder control, groin numbness or new weakness. Patient knows to seek immediate attention by calling 911 or going to nearest ER if these symptoms appear.

## 2021-07-28 NOTE — PHYSICAL EXAM
[FreeTextEntry1] : PE:\par Constitutional: In NAD, calm and cooperative\par HEENT: NCAT, Anicteric sclera, no lid-lag\par Cardio: Extremities appear pink and well perfused, no peripheral edema\par Respiratory: Normal respiratory effort on room air, no accessory muscle use\par Skin: no rashes seen on exposed skin, no visible abrasions\par Psych: Normal affect, intact judgment and insight\par Neuro: Awake, alert and oriented x 3, see below for focused neurological exam\par MSK:\par 	Inspection: no gross swelling identified\par 	Palpation: Tenderness of the bilateral cervical, thoracic,  lumbar paraspinals, TTP over R shoulder\par 	ROM: Minimal Pain at end lumbar extension/flexion, restricted R shoulder abduction\par 	Strength: 5/5 strength in bilateral lower extremities\par 	Reflexes: 2+ Patella/Achilles/brachioradialis/biceps reflex bilaterally, negative clonus bilaterally\par 	Sensation: Intact to light touch in bilateral lower extremities\par Special tests:\par SLR:negative bilaterally\par JESSIKA:negative bilaterally\par FADIR: negative bilaterally\par Neers: positive on R\par Posada: positive on R

## 2021-08-04 ENCOUNTER — APPOINTMENT (OUTPATIENT)
Dept: PHYSICAL MEDICINE AND REHAB | Facility: CLINIC | Age: 46
End: 2021-08-04

## 2021-08-14 ENCOUNTER — APPOINTMENT (OUTPATIENT)
Dept: RADIOLOGY | Facility: CLINIC | Age: 46
End: 2021-08-14
Payer: MEDICAID

## 2021-08-14 ENCOUNTER — APPOINTMENT (OUTPATIENT)
Dept: MRI IMAGING | Facility: CLINIC | Age: 46
End: 2021-08-14
Payer: MEDICAID

## 2021-08-14 ENCOUNTER — OUTPATIENT (OUTPATIENT)
Dept: OUTPATIENT SERVICES | Facility: HOSPITAL | Age: 46
LOS: 1 days | End: 2021-08-14
Payer: MEDICAID

## 2021-08-14 DIAGNOSIS — M54.12 RADICULOPATHY, CERVICAL REGION: ICD-10-CM

## 2021-08-14 DIAGNOSIS — Z98.890 OTHER SPECIFIED POSTPROCEDURAL STATES: Chronic | ICD-10-CM

## 2021-08-14 DIAGNOSIS — Z98.891 HISTORY OF UTERINE SCAR FROM PREVIOUS SURGERY: Chronic | ICD-10-CM

## 2021-08-14 PROCEDURE — 72141 MRI NECK SPINE W/O DYE: CPT | Mod: 26

## 2021-08-14 PROCEDURE — 72141 MRI NECK SPINE W/O DYE: CPT

## 2021-08-14 PROCEDURE — 73030 X-RAY EXAM OF SHOULDER: CPT | Mod: 26,RT

## 2021-08-14 PROCEDURE — 73030 X-RAY EXAM OF SHOULDER: CPT

## 2021-08-18 ENCOUNTER — RX CHANGE (OUTPATIENT)
Age: 46
End: 2021-08-18

## 2021-08-18 RX ORDER — DULOXETINE HYDROCHLORIDE 30 MG/1
30 CAPSULE, DELAYED RELEASE PELLETS ORAL
Qty: 60 | Refills: 1 | Status: DISCONTINUED | COMMUNITY
Start: 2021-07-26 | End: 2021-08-18

## 2021-08-30 ENCOUNTER — APPOINTMENT (OUTPATIENT)
Dept: PHYSICAL MEDICINE AND REHAB | Facility: CLINIC | Age: 46
End: 2021-08-30
Payer: MEDICAID

## 2021-08-30 VITALS
HEART RATE: 84 BPM | SYSTOLIC BLOOD PRESSURE: 136 MMHG | WEIGHT: 180 LBS | BODY MASS INDEX: 29.99 KG/M2 | RESPIRATION RATE: 14 BRPM | DIASTOLIC BLOOD PRESSURE: 82 MMHG | HEIGHT: 65 IN

## 2021-08-30 DIAGNOSIS — G89.0 CENTRAL PAIN SYNDROME: ICD-10-CM

## 2021-08-30 DIAGNOSIS — M75.51 BURSITIS OF RIGHT SHOULDER: ICD-10-CM

## 2021-08-30 DIAGNOSIS — M54.12 RADICULOPATHY, CERVICAL REGION: ICD-10-CM

## 2021-08-30 PROCEDURE — 99214 OFFICE O/P EST MOD 30 MIN: CPT

## 2021-08-30 NOTE — HISTORY OF PRESENT ILLNESS
[FreeTextEntry1] : Ms. SUZANNE BACH is a 45 year old  female who presents for follow up. At last visit, she was continued on PT, ordered an MRI C Spine and R shoulder and tried on Cymbalta. Since last visit, patient has been having more mid back pain and now down her L left leg. Denies any other new symptoms. Also still complains of R shoulder pain\par \par Location:Entire Spine, worse in midback/R upper back lately,  R shoulder\par Onset:Early 4/2021, worsened, no specific triggering event. Went to Urgent Care who gave her some muscle relaxers/naproxen. \par Provocation/Palliative:Worse with activity, better somewhat with rest\par Quality:Achiness\par Radiation:Now down R arm, and now down her LLE intermittently\par Severity:8/10\par Timing:Not improving with time\par \par No bowel/bladder changes. No groin numbness.

## 2021-08-30 NOTE — DATA REVIEWED
[FreeTextEntry1] : \par   MR Cervical Spine No Cont             Final\par \par No Documents Attached\par \par \par \par \par   EXAM:  MR SPINE CERVICAL\par \par \par PROCEDURE DATE:  08/14/2021\par \par \par \par INTERPRETATION:  CERVICAL SPINE MRI\par \par CLINICAL INFORMATION: Right-sided neck pain with radiculopathy.\par TECHNIQUE: Multiplanar, multisequence MRI was obtained of the cervical spine.\par \par FINDINGS:\par \par DISC LEVEL EVALUATION:\par \par C1/2: Normal\par C2/C3: Normal\par C3/C4: Normal\par C4/C5: Normal\par C5/C6: There is minimal disc bulging without central canal or foraminal narrowing.\par C6/C7: There is a small broad-based posterior disc protrusion that minimally indents the ventral thecal sac without significant central canal stenosis. There is minimal right foraminal narrowing.\par C7/T1: Normal\par \par Small posterior disc protrusion is seen in the upper thoracic spine the largest is within the left paracentral region at T3-T4. Mild upper thoracic facet arthrosis is present.\par \par ALIGNMENT: There is straightening of the normal cervical lordosis.\par CORD: There is no cord signal abnormality.\par MARROW: There is no bone marrow edema or fracture.\par IMAGED BRAIN: Normal\par PERIPHERAL/NECK SOFT TISSUES: Normal\par \par IMPRESSION: Mild multilevel lower cervical and upper thoracic spondylosis as described above.\par \par --- End of Report ---\par \par \par \par \par \par \par BULL WALDEN MD; Attending Radiologist\par This document has been electronically signed. Aug 22 2021  1:34PM\par \par  \par \par  Ordered by: DOROTHY LYN       Collected/Examined: [id number] 11:19AM       \par Verified by: DOROTHY LYN [id number] 10:21AM       \par  Result Communication: No patient communication needed at this time;\par Stage: Final       \par  Performed at: [facility] [place]       Resulted: [id number] 01:32PM       Last Updated: 23Aug2021 10:21AM       Accession: [id number]

## 2021-08-30 NOTE — ASSESSMENT
[FreeTextEntry1] : Ms. SUZANNE BACH is a 45 year old female who presents with persistent entire back pain, likely myofascial in nature, but possibly due to underlying spondylosis. Pain has not improved significantly with PT, cymbalta or NSAIDs. Patient also has R shoulder pain, likely due to subacromial bursitis. Given her multiple sites of pain, she likely has a centralization component of her pain as well.  Denies any red flag signs. Will recommend:\par - Continue PT 2-3x/week for stretching, strengthening, ROM exercises, HEP and modalities PRN including myofascial release, moist heat, and TENS therapy \par - Will order MR R shoulder, T Spine and L Spine to look for underlying pathology given her pain has been more than 6 weeks and she is still having persistent symptoms. \par -Continue Cymbalta 60mg Qhs. Patient advised on potential side effects. \par - Will start trial of Gabapentin 300mg Qhs with gradual increase to 300mg TID. Patient aware of side effects and risks including sedation, leg swelling, and possible mood changes. \par - Can continue Aleve PRN\par \par RTC after MRIs. Patient educated on red flag signs including any changes to their bowel/bladder control, groin numbness or new weakness. Patient knows to seek immediate attention by calling 911 or going to nearest ER if these symptoms appear.

## 2021-08-30 NOTE — REASON FOR VISIT
[Follow-Up] : a follow-up visit [[employer] ] : provided by [employer]   [FreeTextEntry1] : [id number] [TWNoteComboBox1] : Indonesian

## 2021-09-12 ENCOUNTER — APPOINTMENT (OUTPATIENT)
Dept: MRI IMAGING | Facility: CLINIC | Age: 46
End: 2021-09-12
Payer: MEDICAID

## 2021-09-12 ENCOUNTER — OUTPATIENT (OUTPATIENT)
Dept: OUTPATIENT SERVICES | Facility: HOSPITAL | Age: 46
LOS: 1 days | End: 2021-09-12
Payer: MEDICAID

## 2021-09-12 DIAGNOSIS — Z00.8 ENCOUNTER FOR OTHER GENERAL EXAMINATION: ICD-10-CM

## 2021-09-12 DIAGNOSIS — Z98.890 OTHER SPECIFIED POSTPROCEDURAL STATES: Chronic | ICD-10-CM

## 2021-09-12 DIAGNOSIS — Z98.891 HISTORY OF UTERINE SCAR FROM PREVIOUS SURGERY: Chronic | ICD-10-CM

## 2021-09-12 PROCEDURE — 72148 MRI LUMBAR SPINE W/O DYE: CPT | Mod: 26

## 2021-09-12 PROCEDURE — 72146 MRI CHEST SPINE W/O DYE: CPT

## 2021-09-12 PROCEDURE — 72146 MRI CHEST SPINE W/O DYE: CPT | Mod: 26

## 2021-09-12 PROCEDURE — 72148 MRI LUMBAR SPINE W/O DYE: CPT

## 2021-09-20 ENCOUNTER — APPOINTMENT (OUTPATIENT)
Dept: PHYSICAL MEDICINE AND REHAB | Facility: CLINIC | Age: 46
End: 2021-09-20
Payer: MEDICAID

## 2021-09-20 VITALS
BODY MASS INDEX: 29.99 KG/M2 | WEIGHT: 180 LBS | HEART RATE: 87 BPM | DIASTOLIC BLOOD PRESSURE: 81 MMHG | HEIGHT: 65 IN | SYSTOLIC BLOOD PRESSURE: 143 MMHG

## 2021-09-20 DIAGNOSIS — M54.9 DORSALGIA, UNSPECIFIED: ICD-10-CM

## 2021-09-20 DIAGNOSIS — M79.18 MYALGIA, OTHER SITE: ICD-10-CM

## 2021-09-20 PROCEDURE — 99214 OFFICE O/P EST MOD 30 MIN: CPT

## 2021-09-20 RX ORDER — DULOXETINE HYDROCHLORIDE 60 MG/1
60 CAPSULE, DELAYED RELEASE PELLETS ORAL
Qty: 90 | Refills: 0 | Status: ACTIVE | COMMUNITY
Start: 2021-08-30 | End: 1900-01-01

## 2021-09-20 NOTE — ASSESSMENT
[FreeTextEntry1] : Ms. SUZANNE BACH is a 45 year old female who presents with persistent entire back pain, likely  due to underlying spondylosis. Pain has not improved significantly with PT, cymbalta or NSAIDs. Patient also has R shoulder pain, likely due to subacromial bursitis. Patient found to have a large herniation at T5-6 on her T Spine MRI along with multiple nerve root impingements on the lumbar spine.  Denies any red flag signs. Will recommend:\par - Neurosurgery evaluation given significant pain and MRI findings\par - Continue PT 2-3x/week for stretching, strengthening, ROM exercises, HEP and modalities PRN including myofascial release, moist heat, and TENS therapy \par -Continue Cymbalta 60mg Qhs. Patient advised on potential side effects and to take medication with food. \par - Will start trial of Gabapentin 300mg Qhs with gradual increase to 300mg TID. Advised to take with food.Patient aware of side effects and risks including sedation, leg swelling, and possible mood changes. \par - Can continue Aleve PRN\par \par RTC after MRIs. Patient educated on red flag signs including any changes to their bowel/bladder control, groin numbness or new weakness. Patient knows to seek immediate attention by calling 911 or going to nearest ER if these symptoms appear.

## 2021-09-20 NOTE — PHYSICAL EXAM
[FreeTextEntry1] : PE:\par Constitutional: In NAD, calm and cooperative\par HEENT: NCAT, Anicteric sclera, no lid-lag\par Cardio: Extremities appear pink and well perfused, no peripheral edema\par Respiratory: Normal respiratory effort on room air, no accessory muscle use\par Skin: no rashes seen on exposed skin, no visible abrasions\par Psych: Normal affect, intact judgment and insight\par Neuro: Awake, alert and oriented x 3, see below for focused neurological exam\par MSK:\par 	Inspection: no gross swelling identified\par 	Palpation: Tenderness of the bilateral cervical, thoracic,  lumbar paraspinals, TTP over R shoulder\par 	ROM: Minimal Pain at end lumbar extension/flexion, restricted R shoulder abduction\par 	Strength: 5/5 strength in bilateral lower extremities\par 	Reflexes: 2+ Patella/Achilles/brachioradialis/biceps reflex bilaterally, negative clonus bilaterally\par 	Sensation: Intact to light touch in bilateral lower extremities\par Special tests:\par SLR:positive on left, equivocal on right\par JESSIKA:negative bilaterally\par FADIR: negative bilaterally\par Neers: positive on R\par Posada: positive on R

## 2021-09-20 NOTE — HISTORY OF PRESENT ILLNESS
[FreeTextEntry1] : Ms. SUZANNE BACH is a 45 year old  female who presents for follow up. At last visit, patient was ordered an MRI R Shoulder, T Spine and L Spine, she was continued on cymbalta and PT, and started on trial of Gabapentin. Overall she feels similar. The worst pain right now is in the midback, now with a radicular component around to her lower chest/abdomen. She tried Cymbalta/Gabapentin but says it was upsetting her stomach but she was taking it on an empty stomach. \par \par Location:Entire Spine, worse in midback/R upper back lately, R shoulder\par Onset:Early 4/2021, worsened, no specific triggering event. Went to Urgent Care who gave her some muscle relaxers/naproxen. \par Provocation/Palliative:Worse with activity, better somewhat with rest\par Quality:Achiness\par Radiation:Now down R arm, and now down her LLE intermittently and from her midback around to the front abdomen/chest. \par Severity:8-9/10\par Timing:Not improving with time\par \par No bowel/bladder changes. No groin numbness.

## 2021-09-20 NOTE — DATA REVIEWED
[FreeTextEntry1] : \par   MR Lumbar Spine No Cont             Final\par \par No Documents Attached\par \par \par \par \par   EXAM:  MR SPINE THORACIC\par \par EXAM:  MR SPINE LUMBAR\par \par \par PROCEDURE DATE:  09/12/2021\par \par \par \par INTERPRETATION:  CLINICAL INFORMATION: Persistent low back pain, now with radicular symptoms down left lower extremity.\par \par TECHNIQUE: Multiplanar, multisequence MR imaging was obtained of the thoracic and lumbosacral spine.\par \par THORACIC SPINE:\par \par COMPARISON: None available.\par \par FINDINGS:\par \par \par SPINAL ALIGNMENT: Minimal thoracolumbar kyphosis centered at T12.\par SPINAL CORD: No abnormal cord signal.\par MARROW: Minimal loss of height anteriorly at T12 which is chronic. No bone marrow edema. Small T10 vertebral body hemangioma.\par INTERVERTEBRAL DISCS:\par \par T1/T2: Small central disc protrusion without central or neuroforaminal stenosis.\par T2/T3: Tiny central disc protrusion. No central or neuroforaminal stenosis.\par T3/T4: Mild left paracentral disc protrusion compresses the left ventral thecal sac. No neuroforaminal stenosis.\par T4/T5: No central or neuroforaminal stenosis.\par T5/T6: Moderate central/right paracentral disc protrusion contacts and mildly indents the ventral cord. No neuroforaminal stenosis.\par T6/T7: Small central disc protrusion without significant central or neuroforaminal stenosis.\par T7/T8: Moderate left paracentral disc protrusion indents but does not definitely contact the left ventral cord. No neuroforaminal stenosis.\par T8/T9: Mild right paracentral disc protrusion compresses the ventral thecal sac and comes close the ventral cord. No neuroforaminal stenosis.\par T9/T10: Mild left lateral recess/foraminal disc osteophyte protrusion results in moderate left neuroforaminal stenosis. No right neural foraminal or central stenosis.\par T10/T11: Normal left foraminal disc protrusion with mild left foraminal narrowing.\par T11/12: No central or neuroforaminal stenosis.\par T12-L1: Mild central disc protrusion which mildly indents the ventral thecal sac. No spinal canal stenosis or foraminal narrowing.\par \par PARASPINAL MUSCLE AND SOFT TISSUES: Normal.\par INTRATHORACIC SOFT TISSUES: Unremarkable.\par \par \par LUMBOSACRAL SPINE:\par \par COMPARISON: None available.\par FINDINGS:\par \par DISC LEVEL EVALUATION:\par \par L1/L2: No central or neuroforaminal stenosis.\par L2/L3: No central or neuroforaminal stenosis.\par L3/L4: Mild left paracentral disc protrusion which contacts the left descending L4 nerve root without mass effect. There is mild lateral recess and left foraminal narrowing.\par L4/L5: Minimal diffuse disc bulge with a central and left foraminal annular fissure with a minimal left foraminal disc protrusion. There is also mild bilateral facet arthrosis resulting in mild left neuroforaminal stenosis. No central stenosis.\par L5/S1: Left paracentral annular fissure with a mild left paracentral disc protrusion which contacts and flattens the left descending S1 nerve root. There is also mild bilateral facet arthropathy. There is mild left and minimal right foraminal narrowing. No central stenosis.\par \par SPINAL ALIGNMENT: The normal lumbar lordosis is preserved. No spondylolisthesis.\par DISTAL CORD AND CONUS: The conus terminates at L1. No abnormal cord signal.\par SI JOINTS: Preserved.\par MARROW: No abnormal signal.\par PARASPINAL MUSCLE AND SOFT TISSUES: Unremarkable.\par INTRAABDOMINAL/INTRAPELVIC SOFT TISSUES: Unremarkable.\par \par IMPRESSION:\par \par THORACIC SPINE: Moderate multilevel degenerative disc disease as described above, with a moderate central/right paracentral disc protrusion contacting and mildly indenting the ventral cord at T5-T6, and with moderate left foraminal narrowing at T9-T10.\par \par LUMBAR SPINE: Mild left paracentral disc protrusion at L3-4 which contacts the left descending L4 nerve root with mild lateral recess and left foraminal narrowing. Mild left paracentral disc protrusion at L5-S1 which contacts and flattens the left descending S1 nerve root with mild left foraminal narrowing.\par \par --- End of Report ---\par \par \par \par \par \par ELISEO PEARCE MD; Resident Radiology\par This document has been electronically signed.\par LILI PRATER MD; Attending Radiologist\par This document has been electronically signed. Sep 17 2021 12:05PM\par \par  \par \par  Ordered by: DOROTHY LYN       Collected/Examined: 12Sep2021 10:28AM       \par Verified by: DOROTHY LYN 17Sep2021 01:18PM       \par  Result Communication: No patient communication needed at this time;\par Stage: Final       \par  Performed at: [facility] [place]       Resulted: 17Sep2021 09:42AM       Last Updated: 17Sep2021 01:18PM       Accession: [id number]

## 2021-10-08 ENCOUNTER — APPOINTMENT (OUTPATIENT)
Dept: PULMONOLOGY | Facility: CLINIC | Age: 46
End: 2021-10-08
Payer: MEDICAID

## 2021-10-08 VITALS
SYSTOLIC BLOOD PRESSURE: 130 MMHG | HEART RATE: 90 BPM | RESPIRATION RATE: 14 BRPM | OXYGEN SATURATION: 99 % | WEIGHT: 179 LBS | BODY MASS INDEX: 29.79 KG/M2 | DIASTOLIC BLOOD PRESSURE: 80 MMHG

## 2021-10-08 DIAGNOSIS — K21.00 GASTRO-ESOPHAGEAL REFLUX DISEASE WITH ESOPHAGITIS, WITHOUT BLEEDING: ICD-10-CM

## 2021-10-08 PROCEDURE — 99214 OFFICE O/P EST MOD 30 MIN: CPT

## 2021-10-08 RX ORDER — FLUTICASONE PROPIONATE AND SALMETEROL 250; 50 UG/1; UG/1
250-50 POWDER RESPIRATORY (INHALATION)
Qty: 1 | Refills: 5 | Status: ACTIVE | COMMUNITY
Start: 2021-10-08 | End: 1900-01-01

## 2021-10-08 NOTE — DISCUSSION/SUMMARY
[FreeTextEntry1] : Obesity, mild BMI 32, no significant RONDA, but  GERD noted\par Asthma , last spirometry normal,Never smoker\par now using rescue 2 x week, trigger appears to be reflux, no active rhinitis or new exposures\par Will start Advair bid, prn rescue\par GI follow up, continue prilosec and nocturnal GERD precautions\par No clinical evidence of any active infection\par repeat CXR, spirometry, quantiferon, asthma profile\par Had Pfizer x2, needs booster\par 4-6 months or sooner if needed, will call with above results\par \par

## 2021-10-08 NOTE — HISTORY OF PRESENT ILLNESS
[FreeTextEntry1] : requiring rescue  2 x week\par no fever, chill, chest pain\par no sputum\par has significant GERD nocturnally\par no new exposures \par had sleep study, saw LE- ( AHI 4), no Rx indicated\par mother in [place] had TB, she was with her mother for 2 days ( 2 months ago)\par she has no fever, chill, night sweats

## 2021-10-08 NOTE — CONSULT LETTER
[Dear  ___] : Dear  [unfilled], [Consult Letter:] : I had the pleasure of evaluating your patient, [unfilled]. [Please see my note below.] : Please see my note below. [Sincerely,] : Sincerely, [FreeTextEntry3] : [facility]\par Pulmonary Critical Care\par Director Pulmonary Division\par Medical Director Respiratory Therapy\par [facility]\par \par

## 2021-10-14 ENCOUNTER — APPOINTMENT (OUTPATIENT)
Dept: NEUROSURGERY | Facility: CLINIC | Age: 46
End: 2021-10-14
Payer: MEDICAID

## 2021-10-14 VITALS
HEART RATE: 77 BPM | TEMPERATURE: 98 F | BODY MASS INDEX: 29.16 KG/M2 | HEIGHT: 65 IN | DIASTOLIC BLOOD PRESSURE: 81 MMHG | SYSTOLIC BLOOD PRESSURE: 127 MMHG | OXYGEN SATURATION: 98 % | WEIGHT: 175 LBS

## 2021-10-14 DIAGNOSIS — M25.511 PAIN IN RIGHT SHOULDER: ICD-10-CM

## 2021-10-14 DIAGNOSIS — G89.29 PAIN IN THORACIC SPINE: ICD-10-CM

## 2021-10-14 DIAGNOSIS — M54.6 PAIN IN THORACIC SPINE: ICD-10-CM

## 2021-10-14 PROCEDURE — 99204 OFFICE O/P NEW MOD 45 MIN: CPT

## 2021-10-14 NOTE — CONSULT LETTER
[Dear  ___] : Dear  [unfilled], [Courtesy Letter:] : I had the pleasure of seeing your patient, [unfilled], in my office today. [Sincerely,] : Sincerely, [FreeTextEntry2] : Suzette Mckinney MD\par [address]\[place] [address]\par  [FreeTextEntry1] : Mrs. Valentino is a very pleasant 45-year-old female patient who was seen today in regards to ongoing low back pain, neck pain, bilateral shoulder pain, and left-sided leg pain.  Translation was provided  through  service today.\par \par The patient endorses a several month history of low back pain which subsequently progressed from the right side of the lower back to the left.  The patient then started experiencing pain in the neck and subsequently started experiencing pain in the right shoulder worse than the left.  The patient also complains of numbness and pain radiating down the left leg only when sitting.  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  The patient's pain is constant in the neck and low back but better with recumbency.  The patient's shoulder pain is associated with significant worsening with abduction beyond 90 degrees. The patient's left leg symptoms only occur when sitting.  The patient has attempted 10 sessions of physical therapy, massage therapy, and a muscle relaxant without relief.\par \par The patient endorses a past medical history asthma, anxiety, and depression.  The patient denies allergies to medications.\par \par On examination, the patient is alert, oriented, and compliant with the exam.  The patient has tenderness in the mid thoracic, low back, and neck.  The patient has full range of motion of the left shoulder with active and passive movements.  The patient has a limited range of motion on the right shoulder secondary to pain.  The patient has limited range of motion of the neck and low back secondary to pain.  The patient demonstrates 5/5 strength in the upper and lower extremities bilaterally.  The patient demonstrates 2+ reflexes in the lower extremities bilaterally.\par \par The patient is accompanied with an MRI scan of thoracic spine dated September 12, 2021.  These images demonstrate moderate degenerative changes with several disc herniations and disc bulges.  The most prominent disc bulge is noted at T5/6, but there does not appear to be any cord compression or nerve root compression.  The patient is also accompanied with an MRI scan of the lumbar spine performed the same day.  These images demonstrate mild to moderate degenerative changes in the lumbar spine.  There is a central disc bulge noted at L3/4.  The patient has notably small neural foramen particularly on the left side from L3-S1.  There does not appear to be ongoing compression while the patient is supine. The patient also has an MRI scan of the cervical spine dated August 14, 2021.  These images demonstrate mild degenerative changes without overt compression of the spinal cord or nerve roots.\par \par Taken together, the patient has a clinical history and radiographic findings most consistent with multifactorial  axial back pain and shoulder pathology.  I have recommended follow-up with a shoulder specialist for evaluation of her right shoulder and the possibility of shoulder impingement.  I explained to the patient that, although there are degenerative changes with multilevel disc herniations throughout her spine, there is no ongoing compression of the nerve roots or spinal cord.  As such, I do not believe she is a surgical candidate at this time.  The patient's left-sided leg symptoms appear neurogenic in nature but is most likely related to a pressure neuropathy as the patient does not experience these symptoms when standing.  I have recommended conservative therapy for this as well including a  donut cushion as necessary.  The patient's small foramen on the left in the lumbar spine might explain why her left leg is more sensitive than the right.  I explained to the patient that her axial back pain is most likely a combination of muscle and arthritic pain and that persistent exercise/physical therapy will alleviate at least some of her pain.  However, I have also recommended a rheumatology consult to assess for the possibility of fibromyalgia.  At this time, the patient will be following up with us on an as-needed basis and I explained that we are happy to see her back in the office at any time should the need arise.\par \par  [FreeTextEntry3] : Maynor Helms MD, PhD, FRCPSC \par Attending Neurosurgeon \par [facility] \par [address], [address] \[place] [address] \par Office: [phone] \par Fax: [phone]\par \par

## 2022-02-07 ENCOUNTER — APPOINTMENT (OUTPATIENT)
Dept: PHYSICAL MEDICINE AND REHAB | Facility: CLINIC | Age: 47
End: 2022-02-07
Payer: MEDICAID

## 2022-02-07 VITALS
BODY MASS INDEX: 29.99 KG/M2 | WEIGHT: 180 LBS | DIASTOLIC BLOOD PRESSURE: 77 MMHG | SYSTOLIC BLOOD PRESSURE: 126 MMHG | RESPIRATION RATE: 12 BRPM | HEART RATE: 81 BPM | HEIGHT: 65 IN

## 2022-02-07 DIAGNOSIS — M47.816 SPONDYLOSIS W/OUT MYELOPATHY OR RADICULOPATHY, LUMBAR REGION: ICD-10-CM

## 2022-02-07 DIAGNOSIS — M25.512 PAIN IN RIGHT SHOULDER: ICD-10-CM

## 2022-02-07 DIAGNOSIS — M54.16 RADICULOPATHY, LUMBAR REGION: ICD-10-CM

## 2022-02-07 DIAGNOSIS — M25.511 PAIN IN RIGHT SHOULDER: ICD-10-CM

## 2022-02-07 DIAGNOSIS — M54.2 CERVICALGIA: ICD-10-CM

## 2022-02-07 PROCEDURE — 99214 OFFICE O/P EST MOD 30 MIN: CPT

## 2022-02-07 RX ORDER — DULOXETINE HYDROCHLORIDE 30 MG/1
30 CAPSULE, DELAYED RELEASE PELLETS ORAL
Qty: 60 | Refills: 2 | Status: ACTIVE | COMMUNITY
Start: 2022-02-07 | End: 1900-01-01

## 2022-02-07 RX ORDER — GABAPENTIN 300 MG/1
300 CAPSULE ORAL
Qty: 90 | Refills: 2 | Status: ACTIVE | COMMUNITY
Start: 2022-02-07 | End: 1900-01-01

## 2022-02-07 NOTE — ASSESSMENT
[FreeTextEntry1] : Ms. SUZANNE BACH is a 46 year old female who presents with persistent entire back pain, likely  due to underlying spondylosis. Pain has not improved significantly with PT, or NSAIDs, but did notice a small improvement with Cymbalta. Patient also has L and R shoulder pain, likely due to subacromial bursitis. Patient found to have a large herniation at T5-6 on her T Spine MRI along with multiple nerve root impingements on the lumbar spine but neurosurgery did not recommend intervention at this time. Patient also complaining of random swelling of multiple joints, possibly inflammatory in origin. Denies any red flag signs. Will recommend:\par - Agree with neurosurgery recommendations of Rheum and Ortho consultations. Patient given new referrals today. \par - Continue HEP\par -Will restart Cymbalta 30mg Qhs x 1 week with increase to 60mg Qhs thereafter. Patient advised on potential side effects and to take medication with food. Stressed to patient that she needs to remain on this medication for it to consistently help with her pain. I gave her 2 refills and told he to let me know if she is running low. \par - Will start trial of Gabapentin 300mg Qhs with gradual increase to 900mg Qhs as she cannot take daytime doses as it would make her too sleepy. Advised to take with food.Patient aware of side effects and risks including sedation, leg swelling, and possible mood changes. \par - Can continue Aleve PRN\par \par RTC after Ortho/Rheum consultations. Patient in agreement with plan. Patient educated on red flag signs including any changes to their bowel/bladder control, groin numbness or new weakness. Patient knows to seek immediate attention by calling 911 or going to nearest ER if these symptoms appear.

## 2022-02-07 NOTE — PHYSICAL EXAM
[FreeTextEntry1] : PE:\par Constitutional: In NAD, calm and cooperative\par HEENT: NCAT, Anicteric sclera, no lid-lag\par Cardio: Extremities appear pink and well perfused, no peripheral edema\par Respiratory: Normal respiratory effort on room air, no accessory muscle use\par Skin: no rashes seen on exposed skin, no visible abrasions\par Psych: Normal affect, intact judgment and insight\par Neuro: Awake, alert and oriented x 3, see below for focused neurological exam\par MSK:\par 	Inspection: no gross swelling identified\par 	Palpation: Tenderness of the bilateral cervical, thoracic,  lumbar paraspinals, TTP over L>R shoulder\par 	ROM: Minimal Pain at end lumbar extension/flexion, restricted R shoulder abduction\par 	Strength: 5/5 strength in bilateral lower extremities, 5/5 strength in bilateral UE except for L shoulder abduction which is 4/5 due to pain. \par 	Reflexes: 2+ Patella/Achilles/brachioradialis/biceps reflex bilaterally, negative clonus bilaterally\par 	Sensation: Intact to light touch in bilateral lower extremities\par Special tests:\par SLR:positive on left, equivocal on right\par JESSIKA:negative bilaterally\par FADIR: negative bilaterally\par Neers: positive on L and R\par Posada: positive on L and R

## 2022-02-07 NOTE — HISTORY OF PRESENT ILLNESS
[FreeTextEntry1] : Ms. SUZANNE BACH is a 46 year old  female who presents for follow up. At last visit, she was referred to neurosurgery who recommended an Ortho consult for her shoulder and a rheum consult for possible fibromyalgia, continued on PT, continued on Cymbalta and started on Gabapentin. She then she has run out of her medication so she is currently taking Aleve PRN. Her pain has been worse since running out of her medication. Now also complaining of some L shoulder pain as well as random swelling of joints that come and go. Denies any other new symptoms. \par \par Location:Entire Spine, worse in midback/R upper back lately, R>L shoulder\par Onset:Early 4/2021, worsened, no specific triggering event. Went to Urgent Care who gave her some muscle relaxers/naproxen. \par Provocation/Palliative:Worse with activity, better somewhat with rest\par Quality:Achiness\par Radiation:Now down R arm occasionally, and now down her LLE intermittently and from her midback around to the front abdomen/chest. \par Severity:8-9/10\par Timing:Not improving with time\par \par No bowel/bladder changes. No groin numbness.

## 2022-02-07 NOTE — REASON FOR VISIT
[Follow-Up] : a follow-up visit [[employer] ] : provided by [employer]   [Interpreters_IDNumber] : [id number] [TWNoteComboBox1] : [place]

## 2022-02-21 NOTE — DISCHARGE NOTE PROVIDER - NSDCQMSTROKE_NEU_ALL_CORE
Per discussion with Al and Leandro (patient's  and daughter), they request that we stop checking blood sugar at this time and stop interventions regarding blood sugar. They request that we stop blood draws. Per Al, \"doesn't make a difference at this point\". Leandro and All understand death is imminent, would like to continue pressors for now. Dr Luca Cheng aware. No

## 2022-03-07 ENCOUNTER — APPOINTMENT (OUTPATIENT)
Dept: ORTHOPEDIC SURGERY | Facility: CLINIC | Age: 47
End: 2022-03-07

## 2022-04-05 ENCOUNTER — APPOINTMENT (OUTPATIENT)
Dept: DISASTER EMERGENCY | Facility: CLINIC | Age: 47
End: 2022-04-05

## 2022-04-08 ENCOUNTER — APPOINTMENT (OUTPATIENT)
Dept: PULMONOLOGY | Facility: CLINIC | Age: 47
End: 2022-04-08

## 2022-04-18 ENCOUNTER — NON-APPOINTMENT (OUTPATIENT)
Age: 47
End: 2022-04-18

## 2022-09-01 ENCOUNTER — APPOINTMENT (OUTPATIENT)
Dept: ORTHOPEDIC SURGERY | Facility: CLINIC | Age: 47
End: 2022-09-01

## 2022-09-01 VITALS — WEIGHT: 180 LBS | HEIGHT: 65 IN | BODY MASS INDEX: 29.99 KG/M2

## 2022-09-01 DIAGNOSIS — M67.952 UNSPECIFIED DISORDER OF SYNOVIUM AND TENDON, LEFT THIGH: ICD-10-CM

## 2022-09-01 DIAGNOSIS — M48.04 SPINAL STENOSIS, THORACIC REGION: ICD-10-CM

## 2022-09-01 DIAGNOSIS — M75.52 BURSITIS OF RIGHT SHOULDER: ICD-10-CM

## 2022-09-01 DIAGNOSIS — M75.51 BURSITIS OF RIGHT SHOULDER: ICD-10-CM

## 2022-09-01 DIAGNOSIS — M54.14 RADICULOPATHY, THORACIC REGION: ICD-10-CM

## 2022-09-01 DIAGNOSIS — M25.50 PAIN IN UNSPECIFIED JOINT: ICD-10-CM

## 2022-09-01 PROCEDURE — 99204 OFFICE O/P NEW MOD 45 MIN: CPT

## 2022-09-01 PROCEDURE — 72040 X-RAY EXAM NECK SPINE 2-3 VW: CPT

## 2022-09-01 PROCEDURE — 72100 X-RAY EXAM L-S SPINE 2/3 VWS: CPT

## 2022-09-01 RX ORDER — METHOCARBAMOL 500 MG/1
500 TABLET, FILM COATED ORAL
Qty: 30 | Refills: 1 | Status: ACTIVE | COMMUNITY
Start: 2022-09-01 | End: 1900-01-01

## 2022-09-01 NOTE — PHYSICAL EXAM
[de-identified] : Lumbar exam:\par CONSTITUTIONAL: The patient is a very pleasant individual who is well-nourished and who appears stated age.\par PSYCHIATRIC: The patient is alert and oriented X 3 and in no apparent distress, and participates with orthopedic evaluation well.\par HEAD: Atraumatic and is nonsyndromic in appearance.\par EENT: No visible thyromegaly, EOMI.\par RESPIRATORY: Respiratory rate is regular, not dyspneic on examination.\par LYMPHATICS: There is no inguinal lymphadenopathy\par INTEGUMENTARY: Skin is clean, dry, and intact about the bilateral lower An upper extremities and lumbar And thoracic and cervical spine.\par VASCULAR: There is brisk capillary refill about the bilateral lower extremities.\par NEUROLOGIC: There are no pathologic reflexes. There is no decrease in sensation of the bilateral lower And upper extremities on Wartenberg pinwheel/ manual examination. There is radiculitis and a T7/T8 dermatomal distribution on the left.  Deep tendon reflexes are well maintained at 2+/4 of the bilateral lower extremities and are symmetric..\par MUSCULOSKELETAL: There is no visible muscular atrophy. Manual motor strength is well maintained in the  Right lower extremity, bilateral upper extremities however left lower extremity is 3/5 hip flexor and knee extension. Range of motion of lumbar spine is well maintained With pain on lumbar extension. Cervical range of motion is well maintained with pain on cervical extension across the base of the neck. His deltoid and trapezius myositis bilaterally.. The patient ambulates With an antalgic gait, somewhat Trendelenburg on the left. Negative tension sign and straight leg raise bilaterally. Quad extension, ankle dorsiflexion, EHL, plantar flexion, and ankle eversion are well preserved On the right,quad extension knee extension are 3/5.. Normal secondary orthopaedic exam of bilateral knees and ankles.\par The gluteus tendinopathy on the left, positive pain on hip flexion and external rotation on the left into the groin and buttock. Positive focal bilateral shoulder findings.\par  [de-identified] : MRI of the cervical thoracic and lumbar spine were performedin September at 12 2021 at [facility] imaging results are as follows. His thoracic MRI no cord signal change. The lateral left paracentral disc protrusion, T8-T9 mild right paracentral disc protrusionmild left lateral recess foraminal disc osteophyte in the T9-T10, no Severe spinal canal narrowing.\par Lumbar MRI demonstrates 3 levels with degenerative disc disease L3-L4 through L5-S1 without any significant spinal canal and neural foraminal stenosis. Cervical MRI served cord signal. C6-C7 small disc bulge without any significant spinal canal or foraminal stenosis.\par \par Cervical x-ray done today demonstrates mild age-appropriate spondylosis at C5-C6.

## 2022-09-01 NOTE — DISCUSSION/SUMMARY
[Medication Risks Reviewed] : Medication risks reviewed [de-identified] : Conservative treatment was discussed with the patient at length. Anticipatory guidance regarding disease process Left gluteus tendinopathy, left hip DJD, lumbar spondylosis, cervical spondylosis, bilateral shoulder bursitis, thoracic spondylosis and radiculitis, avoidance of acute exacerbation this was discussed at length and all patients commenting concerns were answered to the patient's satisfaction. Physical therapy for decrease pain and increase function was ordered. Patient was given home exercises as approved by [employer] and works well held directed toward this particular process. Intermittent use of acetaminophen 500 mg 2 tablets t.i.d. p.r.n. mild to moderate pain. All of the cam for anti-inflammatory properties was disc prescribed. Methocarbamol 500 mg p.o. h.s. p.r.n. spasm. Meloxicam 15 mg once daily for anti-inflammatory properties after Medrol dose pack is completed.Home exercise including stretching on a daily basis for 20-30 minutes was recommended. I did show her pendulum And wall walking exercises for her shoulder tendinitis and she expressed understanding, she will do them on a daily basis. Heat, ice, topical were discussed as needed. The patient will followup in 6 weeks at which point in time if symptoms continue we will order possible injection therapy with pain management versus surgical option.\par Thoracic and lumbar MRIs were ordered for a history of thoracic spondylosis with disc herniation and radiculitis concordant with her current burning tingling numbness pain radiatingin a T7/T8 fashion on the left as well as for profound weakness of the left lower extremity which is giving out intermittently.\par If shoulder pain continues we will refer her to shoulder specialty here at [facility]. She is referred to rheumatology for multiple joint pains which I think could be related to Rheumatological illness.  On her next visit we will obtain an AP lateral x-ray of the left hip as I do think she does has some component of left hip DJD.If this is confirmed on x-ray and she still has groin pain we will refer her to hip specialty here at [facility] as well.\par

## 2022-09-01 NOTE — HISTORY OF PRESENT ILLNESS
[de-identified] : 46-year-old female has multiple joint pains. She complains of bilateral focal shoulder pain with abduction of her shoulders. She complains of pain at the base of her neck. She complains of pain at the mid thoracic spine radiating to just under her breast on the left only this is a burning sensation. She also complains of pain of the left buttock left lateral hip and groin. This pain is exacerbated on flexion of the left hip on sitting for long periods of time. Total pain levels rated 7/10 on regular basis, worsened with activity and just a little bit better with rest. She was seeing Dr. Ag of physiatry and was prescribed physical therapy in the past but states that it did not help, last went one year ago and went for approximately 12 visits. She is not yet had any injection therapy.  He is taking naproxen for anti-inflammatory properties from her primary care provider however she states that he is giving her acid reflux and abdominal pain.  She does complain of being off balance and also profound weakness of the left leg which is intermittent and worsens when she walks or stands for long period of time. She states that her leg gives out when she is a sending steps.Past medical surgical allergy and family history was reviewed

## 2022-09-01 NOTE — PROCEDURE
[de-identified] : ketorolac injection 30 mg/Depo-Medrol 40 mg was given intramuscularly in the right buttock after alcohol swab x3. 18-gauge needle was used to draw out the medication, new needle was changed a 22-gauge afterward. Patient tolerated well without any adverse effects.

## 2022-09-16 ENCOUNTER — APPOINTMENT (OUTPATIENT)
Dept: MRI IMAGING | Facility: CLINIC | Age: 47
End: 2022-09-16
Payer: MEDICAID

## 2022-09-16 ENCOUNTER — OUTPATIENT (OUTPATIENT)
Dept: OUTPATIENT SERVICES | Facility: HOSPITAL | Age: 47
LOS: 1 days | End: 2022-09-16
Payer: MEDICAID

## 2022-09-16 DIAGNOSIS — M47.814 SPONDYLOSIS WITHOUT MYELOPATHY OR RADICULOPATHY, THORACIC REGION: ICD-10-CM

## 2022-09-16 DIAGNOSIS — Z98.890 OTHER SPECIFIED POSTPROCEDURAL STATES: Chronic | ICD-10-CM

## 2022-09-16 DIAGNOSIS — M54.14 RADICULOPATHY, THORACIC REGION: ICD-10-CM

## 2022-09-16 DIAGNOSIS — Z00.8 ENCOUNTER FOR OTHER GENERAL EXAMINATION: ICD-10-CM

## 2022-09-16 DIAGNOSIS — M48.04 SPINAL STENOSIS, THORACIC REGION: ICD-10-CM

## 2022-09-16 DIAGNOSIS — Z98.891 HISTORY OF UTERINE SCAR FROM PREVIOUS SURGERY: Chronic | ICD-10-CM

## 2022-09-16 DIAGNOSIS — M54.16 RADICULOPATHY, LUMBAR REGION: ICD-10-CM

## 2022-09-16 DIAGNOSIS — M47.816 SPONDYLOSIS WITHOUT MYELOPATHY OR RADICULOPATHY, LUMBAR REGION: ICD-10-CM

## 2022-09-16 PROCEDURE — 72146 MRI CHEST SPINE W/O DYE: CPT

## 2022-09-16 PROCEDURE — 72148 MRI LUMBAR SPINE W/O DYE: CPT

## 2022-09-16 PROCEDURE — 72148 MRI LUMBAR SPINE W/O DYE: CPT | Mod: 26

## 2022-09-16 PROCEDURE — 72146 MRI CHEST SPINE W/O DYE: CPT | Mod: 26

## 2022-09-28 DIAGNOSIS — M16.12 UNILATERAL PRIMARY OSTEOARTHRITIS, LEFT HIP: ICD-10-CM

## 2022-09-29 ENCOUNTER — APPOINTMENT (OUTPATIENT)
Dept: ORTHOPEDIC SURGERY | Facility: CLINIC | Age: 47
End: 2022-09-29

## 2022-09-29 VITALS
DIASTOLIC BLOOD PRESSURE: 87 MMHG | BODY MASS INDEX: 29.99 KG/M2 | SYSTOLIC BLOOD PRESSURE: 150 MMHG | HEART RATE: 70 BPM | WEIGHT: 180 LBS | HEIGHT: 65 IN | TEMPERATURE: 98.1 F

## 2022-09-29 DIAGNOSIS — G89.29 MYALGIA, OTHER SITE: ICD-10-CM

## 2022-09-29 DIAGNOSIS — M79.18 MYALGIA, OTHER SITE: ICD-10-CM

## 2022-09-29 DIAGNOSIS — M47.814 SPONDYLOSIS W/OUT MYELOPATHY OR RADICULOPATHY, THORACIC REGION: ICD-10-CM

## 2022-09-29 PROCEDURE — 99214 OFFICE O/P EST MOD 30 MIN: CPT

## 2022-09-29 PROCEDURE — 72170 X-RAY EXAM OF PELVIS: CPT

## 2022-09-29 NOTE — DISCUSSION/SUMMARY
[de-identified] : Her biggest concern to me is more of the thoracic spondylosis there is no myelomalacia changes within the cord and she does have a moderate chronic disc herniation at T5-T6.  Lumbar MRIs been reviewed with small disc protrusions again I would not recommend operative management of either 1 of these at this point in time patient is can be referred back to Dr. Wood/physical medicine rehabilitation for injection therapy conservative care physical therapy etc. if patient develops surgical indications such as progressive cervical and/or thoracic myelopathy or worsening lumbar radiculopathy patient will be sent back to us for further surgical discussions however at this point time I would not recommend lumbar decompressive surgery or decompressive surgery nothing in the thoracic spine

## 2022-09-29 NOTE — REASON FOR VISIT
[Follow-Up Visit] : a follow-up visit for [[employer] ] : provided by [employer]   [FreeTextEntry2] : low back pain [Interpreters_IDNumber] : [id number] [Interpreters_FullName] : [place] [TWNoteComboBox1] : [place]

## 2022-09-29 NOTE — PHYSICAL EXAM
[de-identified] : CONSTITUTIONAL: The patient is a very pleasant individual who is well-nourished and who appears stated age.\par PSYCHIATRIC: The patient is alert and oriented X 3 and in no apparent distress, and participates with orthopedic evaluation well.\par HEAD: Atraumatic and is nonsyndromic in appearance.\par EENT: No visible thyromegaly, EOMI.\par RESPIRATORY: Respiratory rate is regular, not dyspneic on examination.\par LYMPHATICS: There is no inguinal lymphadenopathy\par INTEGUMENTARY: Skin is clean, dry, and intact about the bilateral lower extremities and lumbar spine.\par VASCULAR: There is brisk capillary refill about the bilateral lower extremities.\par NEUROLOGIC: There are no pathologic reflexes. There is no decrease in sensation of the bilateral lower extremities on Wartenberg pinwheel examination. Deep tendon reflexes are well maintained at 2+/4 of the bilateral lower extremities and are symmetric..\par MUSCULOSKELETAL: There is no visible muscular atrophy. Manual motor strength is well maintained in the bilateral lower extremities. Range of motion of lumbar spine is well maintained. The patient ambulates in a non-myelopathic manner. Negative tension sign and straight leg raise bilaterally. Quad extension, ankle dorsiflexion, EHL, plantar flexion, and ankle eversion are well preserved. Normal secondary orthopaedic exam of bilateral hips, greater trochanteric area, knees and ankles.  No pathologic reflexes 2 indicate thoracic myelopathy [de-identified] : X-rays were obtained of the patient's left hip on today's date of September 29, 2022 that shows mild degenerative joint disease.  Prior cervical x-rays of September 1, 2022 been reviewed that demonstrates loss of cervical lordosis.\par \par New lumbar and thoracic MRIs have been reviewed from [facility] imaging from September 2022 demonstrates some mild progression of thoracic spondylosis however there is no myelomalacia changes within the cord.  Lumbar degenerative disc disease is also noted.  Previous cervical MRI from 2021 is reviewed with no myelomalacia changes

## 2022-09-29 NOTE — HISTORY OF PRESENT ILLNESS
[10] : a maximum pain level of 10/10 [Bending] : worsened by bending [Lifting] : worsened by lifting [Rest] : relieved by rest [de-identified] : Patient presents for evaluation of thoracic and lumbar MRIs as well as a hip x-ray.  She has been under the care of of Dr. Wood from physiatry.  She is here for surgical review of MRIs and surgical recommendations.  Primary complaint is pain [Ataxia] : no ataxia [Incontinence] : no incontinence [Loss of Dexterity] : good dexterity [Urinary Ret.] : no urinary retention

## 2022-10-13 ENCOUNTER — APPOINTMENT (OUTPATIENT)
Dept: RHEUMATOLOGY | Facility: CLINIC | Age: 47
End: 2022-10-13

## 2023-05-30 NOTE — ASU PATIENT PROFILE, ADULT - AS SC BRADEN MOISTURE
Speech Feeding Treatment Note    Today's date: 2023  Patient name: Stephany Man  : 2023  MRN: [id number]  Referring provider: Bronwyn Green MD  Dx:   Encounter Diagnosis     ICD-10-CM    1  Pediatric feeding disorder, chronic  R63 32           Start Time: 1115  Stop Time: 1203  Total time in clinic (min): 48 minutes    Visit Number: 11  Intervention certification from:   Intervention certification to:     Subjective/Behavioral: Arrived on time to session with dad present and participatory  SLP student present and participatory  Pt engaged in motor activities/play and hygiene prior to eating  Dad reported regression with eating this week, as Marcos Pace had a preference for liquids/drinking as opposed to consuming solids, but occasional refusal with drinking as well  Dad also reported that Marcos Pace is currently recovering from being sick; Suspected resistance to eating this week due to sickness  Planning to do bloodwork this date as recommended by PCP  Foods: Cucumber, grapes, carrot, potato, green beans     Goals  Short Term Goals:  Marcos Pace will demonstrate age appropriate oral motor skills for a variety of liquids and solids in 80% of opps across three sessions  Pt demonstrated appropriate bite-pull in ~80% of opps LITA  Vertical and rotary mastication chewing patterns were observed  Pt demonstrated tongue lateralization to manipulate bolus and maintained appropriate lip closure  Marcos Pace will demonstrate longer active participation in mealtime for at least 25-30 minutes across three sessions  Participated in mealtime in booster seat at table for ~30 minutes  Kadeemmandeep Pace will explore novel and non-perferred foods using the Steps to Eating Hierarchy at least 2-3x per session  Pt explored all foods, poking them with the mini fork and bringing them to her mouth  She further explored food items by dipping vegetables in ranch dressing   She licked the dressing several times and consumed vegetables dipped in dressing 4-5x  Pt benefited from a two-fork system; as the ST loaded one fork, pt ate from the other  Parents will demonstrate responsive feeding techniques to facilitate positive mealtime routine in 80% of opps  Dad is participatory during mealtimes and continues to model exploration of food  Long Term Goals: Tariq Cotto will actively participate in mealtime routine while demonstrating effective oral motor skills for age appropriate solids and liquids without overt aversion and without overt s/sx of aspiration across three meals per day  Parent Goals: get into a mealtime routine and for Avyanna to sleep through the night      Other:Patient's family member was present was present during today's session  and Patient was provided with home exercises/ activies to target goals in plan of care    Recommendations:Continue with Plan of Care (4) rarely moist

## 2023-12-07 ENCOUNTER — APPOINTMENT (OUTPATIENT)
Dept: PULMONOLOGY | Facility: CLINIC | Age: 48
End: 2023-12-07
Payer: MEDICAID

## 2023-12-07 VITALS
BODY MASS INDEX: 30.82 KG/M2 | SYSTOLIC BLOOD PRESSURE: 122 MMHG | HEART RATE: 78 BPM | WEIGHT: 185 LBS | RESPIRATION RATE: 12 BRPM | OXYGEN SATURATION: 99 % | HEIGHT: 65 IN | DIASTOLIC BLOOD PRESSURE: 74 MMHG

## 2023-12-07 DIAGNOSIS — R06.09 OTHER FORMS OF DYSPNEA: ICD-10-CM

## 2023-12-07 PROCEDURE — 99215 OFFICE O/P EST HI 40 MIN: CPT

## 2023-12-07 RX ORDER — MELOXICAM 15 MG/1
15 TABLET ORAL
Qty: 30 | Refills: 0 | Status: DISCONTINUED | COMMUNITY
Start: 2022-09-01 | End: 2023-12-07

## 2023-12-07 RX ORDER — ALBUTEROL SULFATE 2.5 MG/3ML
(2.5 MG/3ML) SOLUTION RESPIRATORY (INHALATION)
Qty: 1 | Refills: 3 | Status: ACTIVE | COMMUNITY
Start: 2023-12-07 | End: 1900-01-01

## 2024-01-23 ENCOUNTER — APPOINTMENT (OUTPATIENT)
Dept: PULMONOLOGY | Facility: CLINIC | Age: 49
End: 2024-01-23
Payer: MEDICAID

## 2024-01-23 VITALS — BODY MASS INDEX: 31.76 KG/M2 | HEIGHT: 64 IN | WEIGHT: 186 LBS

## 2024-01-23 PROCEDURE — 94010 BREATHING CAPACITY TEST: CPT

## 2024-02-22 ENCOUNTER — APPOINTMENT (OUTPATIENT)
Dept: PULMONOLOGY | Facility: CLINIC | Age: 49
End: 2024-02-22

## 2024-03-07 ENCOUNTER — APPOINTMENT (OUTPATIENT)
Dept: PULMONOLOGY | Facility: CLINIC | Age: 49
End: 2024-03-07
Payer: MEDICAID

## 2024-03-07 VITALS
BODY MASS INDEX: 31.92 KG/M2 | HEIGHT: 64 IN | WEIGHT: 187 LBS | OXYGEN SATURATION: 98 % | RESPIRATION RATE: 16 BRPM | HEART RATE: 71 BPM | SYSTOLIC BLOOD PRESSURE: 126 MMHG | DIASTOLIC BLOOD PRESSURE: 80 MMHG

## 2024-03-07 PROCEDURE — 99214 OFFICE O/P EST MOD 30 MIN: CPT

## 2024-03-07 RX ORDER — GABAPENTIN 300 MG/1
300 CAPSULE ORAL 3 TIMES DAILY
Qty: 90 | Refills: 0 | Status: DISCONTINUED | COMMUNITY
Start: 2021-08-30 | End: 2024-03-07

## 2024-03-07 RX ORDER — ALBUTEROL SULFATE 90 UG/1
108 (90 BASE) INHALANT RESPIRATORY (INHALATION)
Qty: 1 | Refills: 5 | Status: DISCONTINUED | COMMUNITY
Start: 2021-02-16 | End: 2024-03-07

## 2024-03-07 RX ORDER — PREDNISONE 10 MG/1
10 TABLET ORAL
Qty: 30 | Refills: 4 | Status: DISCONTINUED | COMMUNITY
Start: 2023-12-07 | End: 2024-03-07

## 2024-03-07 RX ORDER — METHYLPREDNISOLONE 4 MG/1
4 TABLET ORAL
Qty: 1 | Refills: 0 | Status: DISCONTINUED | COMMUNITY
Start: 2022-09-01 | End: 2024-03-07

## 2024-03-07 RX ORDER — ALBUTEROL SULFATE 2.5 MG/3ML
(2.5 MG/3ML) SOLUTION RESPIRATORY (INHALATION)
Qty: 1080 | Refills: 1 | Status: DISCONTINUED | COMMUNITY
Start: 2020-02-06 | End: 2024-03-07

## 2024-03-07 RX ORDER — ALBUTEROL SULFATE 90 UG/1
108 (90 BASE) INHALANT RESPIRATORY (INHALATION)
Qty: 1 | Refills: 5 | Status: DISCONTINUED | COMMUNITY
Start: 2023-12-07 | End: 2024-03-07

## 2024-03-07 RX ORDER — DULOXETINE HYDROCHLORIDE 30 MG/1
30 CAPSULE, DELAYED RELEASE PELLETS ORAL
Qty: 180 | Refills: 1 | Status: DISCONTINUED | COMMUNITY
Start: 2021-08-18 | End: 2024-03-07

## 2024-03-07 NOTE — DISCUSSION/SUMMARY
[FreeTextEntry1] : Assessment  Obesity Moderate positional RONDA  Plan  Weight loss Avoid supine sleep. Above alone could be sufficient. APAP via [facility] 3 month FU

## 2024-03-07 NOTE — CONSULT LETTER
[Dear  ___] : Dear  [unfilled], [Please see my note below.] : Please see my note below. [Consult Letter:] : I had the pleasure of evaluating your patient, [unfilled]. [Consult Closing:] : Thank you very much for allowing me to participate in the care of this patient.  If you have any questions, please do not hesitate to contact me. [Sincerely,] : Sincerely, [DrAllan  ___] : Dr. URBINA

## 2024-03-07 NOTE — HISTORY OF PRESENT ILLNESS
[Never] : never [Awakes Unrefreshed] : awakes unrefreshed [Obstructive Sleep Apnea] : obstructive sleep apnea [Daytime Somnolence] : daytime somnolence [Awakes with Dry Mouth] : awakes with dry mouth [Snoring] : snoring [Recent  Weight Gain] : recent weight gain [TextBox_4] : Having to use albuterol daily, last for 6 hrs than needs again no fever had chest pain 1 week ago with nausea, vomiting not currently ran out of all medication notes more snoring lately  3/7/24 Here for review of HST  [ESS] : 8

## 2024-03-07 NOTE — PHYSICAL EXAM
[No Acute Distress] : no acute distress [Normal Rate/Rhythm] : normal rate/rhythm [Normal Appearance] : normal appearance [Normal S1, S2] : normal s1, s2 [No Murmurs] : no murmurs [No Rubs] : no rubs [No Gallops] : no gallops [No Resp Distress] : no resp distress [No Acc Muscle Use] : no acc muscle use [Normal Rhythm and Effort] : normal rhythm and effort [Clear to Auscultation Bilaterally] : clear to auscultation bilaterally [Normal to Percussion] : normal to percussion [No Abnormalities] : no abnormalities [Normal Gait] : normal gait [No Clubbing] : no clubbing [No Cyanosis] : no cyanosis [FROM] : FROM [No Edema] : no edema [Normal Color/ Pigmentation] : normal color/ pigmentation [No Focal Deficits] : no focal deficits [Oriented x3] : oriented x3 [Normal Affect] : normal affect

## 2024-06-19 ENCOUNTER — APPOINTMENT (OUTPATIENT)
Dept: PULMONOLOGY | Facility: CLINIC | Age: 49
End: 2024-06-19
Payer: COMMERCIAL

## 2024-06-19 VITALS
HEIGHT: 65 IN | DIASTOLIC BLOOD PRESSURE: 78 MMHG | OXYGEN SATURATION: 98 % | RESPIRATION RATE: 16 BRPM | SYSTOLIC BLOOD PRESSURE: 126 MMHG | BODY MASS INDEX: 31.65 KG/M2 | WEIGHT: 190 LBS | HEART RATE: 86 BPM

## 2024-06-19 DIAGNOSIS — G47.33 OBSTRUCTIVE SLEEP APNEA (ADULT) (PEDIATRIC): ICD-10-CM

## 2024-06-19 DIAGNOSIS — E66.9 OBESITY, UNSPECIFIED: ICD-10-CM

## 2024-06-19 DIAGNOSIS — J45.909 UNSPECIFIED ASTHMA, UNCOMPLICATED: ICD-10-CM

## 2024-06-19 PROCEDURE — 99213 OFFICE O/P EST LOW 20 MIN: CPT

## 2024-06-19 RX ORDER — ALBUTEROL SULFATE 90 UG/1
108 (90 BASE) INHALANT RESPIRATORY (INHALATION)
Qty: 1 | Refills: 5 | Status: ACTIVE | COMMUNITY
Start: 2024-01-23 | End: 1900-01-01

## 2024-06-19 RX ORDER — FLUTICASONE PROPIONATE AND SALMETEROL 250; 50 UG/1; UG/1
250-50 POWDER RESPIRATORY (INHALATION)
Qty: 60 | Refills: 4 | Status: ACTIVE | COMMUNITY
Start: 2023-12-07 | End: 1900-01-01

## 2024-06-19 NOTE — HISTORY OF PRESENT ILLNESS
[Never] : never [Obstructive Sleep Apnea] : obstructive sleep apnea [Awakes Unrefreshed] : awakes unrefreshed [Awakes with Dry Mouth] : awakes with dry mouth [Daytime Somnolence] : daytime somnolence [Recent  Weight Gain] : recent weight gain [Snoring] : snoring [TextBox_4] : Having to use albuterol daily, last for 6 hrs than needs again no fever had chest pain 1 week ago with nausea, vomiting not currently ran out of all medication notes more snoring lately  3/7/24 Here for review of HST  6/19/24 Compliant with and benefiting from nocturnal CPAP Unhappy with mask leak with F30i Wants to try pillows Sampled with P10  [ESS] : 8

## 2024-06-19 NOTE — DISCUSSION/SUMMARY
[FreeTextEntry1] : Assessment  Obesity Moderate positional RONDA Compliant with and benefiting from nocturnal CPAP  Plan  Weight loss Avoid supine sleep. Above alone could be sufficient. New WP HST with positional treatment alone APAP via [facility]=>[facility] 3 month FU

## 2024-09-11 ENCOUNTER — OFFICE (OUTPATIENT)
Dept: URBAN - METROPOLITAN AREA CLINIC 12 | Facility: CLINIC | Age: 49
Setting detail: OPHTHALMOLOGY
End: 2024-09-11
Payer: MEDICAID

## 2024-09-11 DIAGNOSIS — H40.1134: ICD-10-CM

## 2024-09-11 DIAGNOSIS — H25.13: ICD-10-CM

## 2024-09-11 DIAGNOSIS — H16.223: ICD-10-CM

## 2024-09-11 PROCEDURE — 76514 ECHO EXAM OF EYE THICKNESS: CPT | Performed by: STUDENT IN AN ORGANIZED HEALTH CARE EDUCATION/TRAINING PROGRAM

## 2024-09-11 PROCEDURE — 92083 EXTENDED VISUAL FIELD XM: CPT | Performed by: STUDENT IN AN ORGANIZED HEALTH CARE EDUCATION/TRAINING PROGRAM

## 2024-09-11 PROCEDURE — 92133 CPTRZD OPH DX IMG PST SGM ON: CPT | Performed by: STUDENT IN AN ORGANIZED HEALTH CARE EDUCATION/TRAINING PROGRAM

## 2024-09-11 PROCEDURE — 92020 GONIOSCOPY: CPT | Performed by: STUDENT IN AN ORGANIZED HEALTH CARE EDUCATION/TRAINING PROGRAM

## 2024-09-11 PROCEDURE — 99213 OFFICE O/P EST LOW 20 MIN: CPT | Performed by: STUDENT IN AN ORGANIZED HEALTH CARE EDUCATION/TRAINING PROGRAM

## 2024-09-11 ASSESSMENT — LID EXAM ASSESSMENTS
OS_COMMENTS: EYELINER TATTOO
OD_COMMENTS: EYELINER TATTOO

## 2024-09-11 ASSESSMENT — CONFRONTATIONAL VISUAL FIELD TEST (CVF)
OD_FINDINGS: FULL
OS_FINDINGS: FULL

## 2024-09-24 ENCOUNTER — APPOINTMENT (OUTPATIENT)
Dept: RHEUMATOLOGY | Facility: CLINIC | Age: 49
End: 2024-09-24

## 2024-10-17 ENCOUNTER — APPOINTMENT (OUTPATIENT)
Dept: PULMONOLOGY | Facility: CLINIC | Age: 49
End: 2024-10-17
Payer: COMMERCIAL

## 2024-10-17 VITALS
WEIGHT: 191 LBS | BODY MASS INDEX: 31.82 KG/M2 | RESPIRATION RATE: 16 BRPM | HEIGHT: 65 IN | DIASTOLIC BLOOD PRESSURE: 86 MMHG | OXYGEN SATURATION: 98 % | SYSTOLIC BLOOD PRESSURE: 116 MMHG | HEART RATE: 86 BPM

## 2024-10-17 DIAGNOSIS — J30.9 ALLERGIC RHINITIS, UNSPECIFIED: ICD-10-CM

## 2024-10-17 DIAGNOSIS — E66.9 OBESITY, UNSPECIFIED: ICD-10-CM

## 2024-10-17 DIAGNOSIS — G47.33 OBSTRUCTIVE SLEEP APNEA (ADULT) (PEDIATRIC): ICD-10-CM

## 2024-10-17 DIAGNOSIS — J45.909 UNSPECIFIED ASTHMA, UNCOMPLICATED: ICD-10-CM

## 2024-10-17 PROCEDURE — 99213 OFFICE O/P EST LOW 20 MIN: CPT

## 2024-10-17 RX ORDER — LOSARTAN POTASSIUM 50 MG/1
50 TABLET, FILM COATED ORAL
Refills: 0 | Status: ACTIVE | COMMUNITY

## 2024-10-25 ENCOUNTER — OFFICE (OUTPATIENT)
Dept: URBAN - METROPOLITAN AREA CLINIC 102 | Facility: CLINIC | Age: 49
Setting detail: OPHTHALMOLOGY
End: 2024-10-25
Payer: MEDICAID

## 2024-10-25 ENCOUNTER — RX ONLY (RX ONLY)
Age: 49
End: 2024-10-25

## 2024-10-25 DIAGNOSIS — H25.13: ICD-10-CM

## 2024-10-25 DIAGNOSIS — H40.1134: ICD-10-CM

## 2024-10-25 DIAGNOSIS — H52.4: ICD-10-CM

## 2024-10-25 DIAGNOSIS — H11.153: ICD-10-CM

## 2024-10-25 PROCEDURE — 92250 FUNDUS PHOTOGRAPHY W/I&R: CPT | Performed by: STUDENT IN AN ORGANIZED HEALTH CARE EDUCATION/TRAINING PROGRAM

## 2024-10-25 PROCEDURE — 92015 DETERMINE REFRACTIVE STATE: CPT | Performed by: STUDENT IN AN ORGANIZED HEALTH CARE EDUCATION/TRAINING PROGRAM

## 2024-10-25 PROCEDURE — 92014 COMPRE OPH EXAM EST PT 1/>: CPT | Performed by: STUDENT IN AN ORGANIZED HEALTH CARE EDUCATION/TRAINING PROGRAM

## 2024-10-25 ASSESSMENT — KERATOMETRY
OS_K2POWER_DIOPTERS: 44.50
OD_K1POWER_DIOPTERS: 43.50
OS_K1POWER_DIOPTERS: 43.50
OS_AXISANGLE_DEGREES: 095
METHOD_AUTO_MANUAL: AUTO
OD_AXISANGLE_DEGREES: 080
OD_K2POWER_DIOPTERS: 44.00

## 2024-10-25 ASSESSMENT — REFRACTION_CURRENTRX
OD_OVR_VA: 20/
OD_VPRISM_DIRECTION: PROGS
OS_ADD: +1.75
OD_AXIS: 091
OD_CYLINDER: -0.50
OS_AXIS: 38
OD_CYLINDER: -0.50
OS_CYLINDER: SPHERE
OD_OVR_VA: 20/
OS_SPHERE: +0.75
OS_CYLINDER: -0.25
OD_SPHERE: +0.75
OS_CYLINDER: SPHERE
OS_SPHERE: +0.75
OD_VPRISM_DIRECTION: PROGS
OD_VPRISM_DIRECTION: SV
OD_AXIS: 0
OS_OVR_VA: 20/
OS_VPRISM_DIRECTION: PROGS
OS_VPRISM_DIRECTION: PROGS
OS_SPHERE: +0.75
OD_SPHERE: +1.25
OD_SPHERE: +0.75
OS_AXIS: 180
OD_ADD: +1.75
OD_CYLINDER: SPHERE
OS_AXIS: 0
OS_OVR_VA: 20/
OS_ADD: +1.75
OS_VPRISM_DIRECTION: SV
OS_OVR_VA: 20/
OD_OVR_VA: 20/
OD_ADD: +1.75
OD_AXIS: 45

## 2024-10-25 ASSESSMENT — REFRACTION_AUTOREFRACTION
OD_CYLINDER: -0.25
OS_AXIS: 015
OD_AXIS: 071
OS_SPHERE: +2.00
OS_CYLINDER: -0.25
OD_SPHERE: +1.75

## 2024-10-25 ASSESSMENT — REFRACTION_MANIFEST
OD_ADD: +1.75
OD_SPHERE: +1.50
OD_ADD: +1.50
OD_CYLINDER: -0.25
OS_VA1: 20/20
OS_CYLINDER: -0.50
OS_VA1: 20/20-2
OS_ADD: +1.50
OD_AXIS: 071
OD_VA1: 20/20-2
OD_CYLINDER: -0.50
OS_ADD: +1.75
OD_VA1: 20/20
OS_AXIS: 010
OD_AXIS: 090
OS_CYLINDER: SPJ
OD_SPHERE: +0.75
OS_SPHERE: +1.75
OS_SPHERE: +0.75

## 2024-10-25 ASSESSMENT — LID EXAM ASSESSMENTS
OD_COMMENTS: EYELINER TATTOO
OS_COMMENTS: EYELINER TATTOO

## 2024-10-25 ASSESSMENT — PUNCTA - ASSESSMENT
OS_PUNCTA: SIL PLUG LLL
OD_PUNCTA: SIL PLUG RLL

## 2024-10-25 ASSESSMENT — VISUAL ACUITY
OS_BCVA: 20/50-2
OD_BCVA: 20/50+1

## 2024-10-25 ASSESSMENT — CONFRONTATIONAL VISUAL FIELD TEST (CVF)
OD_FINDINGS: FULL
OS_FINDINGS: FULL

## 2024-10-31 ENCOUNTER — OUTPATIENT (OUTPATIENT)
Dept: OUTPATIENT SERVICES | Facility: HOSPITAL | Age: 49
LOS: 1 days | End: 2024-10-31

## 2024-10-31 DIAGNOSIS — Z98.890 OTHER SPECIFIED POSTPROCEDURAL STATES: Chronic | ICD-10-CM

## 2024-10-31 DIAGNOSIS — G47.33 OBSTRUCTIVE SLEEP APNEA (ADULT) (PEDIATRIC): ICD-10-CM

## 2024-10-31 DIAGNOSIS — Z98.891 HISTORY OF UTERINE SCAR FROM PREVIOUS SURGERY: Chronic | ICD-10-CM

## 2024-10-31 PROCEDURE — 95800 SLP STDY UNATTENDED: CPT

## 2024-10-31 PROCEDURE — 95800 SLP STDY UNATTENDED: CPT | Mod: 26

## 2024-12-07 ENCOUNTER — OFFICE (OUTPATIENT)
Dept: URBAN - METROPOLITAN AREA CLINIC 102 | Facility: CLINIC | Age: 49
Setting detail: OPHTHALMOLOGY
End: 2024-12-07
Payer: MEDICAID

## 2024-12-07 DIAGNOSIS — H40.1114: ICD-10-CM

## 2024-12-07 PROCEDURE — 65855 TRABECULOPLASTY LASER SURG: CPT | Mod: RT | Performed by: STUDENT IN AN ORGANIZED HEALTH CARE EDUCATION/TRAINING PROGRAM

## 2024-12-07 ASSESSMENT — REFRACTION_CURRENTRX
OD_VPRISM_DIRECTION: PROGS
OS_AXIS: 180
OD_ADD: +1.75
OS_SPHERE: +0.75
OD_OVR_VA: 20/
OS_VPRISM_DIRECTION: PROGS
OD_CYLINDER: -0.50
OD_SPHERE: +0.75
OS_SPHERE: +0.75
OD_SPHERE: +0.75
OD_OVR_VA: 20/
OS_VPRISM_DIRECTION: SV
OS_OVR_VA: 20/
OS_ADD: +1.75
OS_CYLINDER: SPHERE
OD_ADD: +1.75
OD_CYLINDER: SPHERE
OD_VPRISM_DIRECTION: SV
OD_SPHERE: +1.25
OD_OVR_VA: 20/
OD_AXIS: 091
OS_CYLINDER: SPHERE
OS_CYLINDER: -0.25
OS_AXIS: 0
OD_CYLINDER: -0.50
OS_ADD: +1.75
OS_OVR_VA: 20/
OS_SPHERE: +0.75
OD_AXIS: 0
OD_VPRISM_DIRECTION: PROGS
OS_VPRISM_DIRECTION: PROGS
OD_AXIS: 45
OS_AXIS: 38
OS_OVR_VA: 20/

## 2024-12-07 ASSESSMENT — REFRACTION_AUTOREFRACTION
OD_AXIS: 084
OD_SPHERE: +1.75
OS_SPHERE: +1.75
OS_AXIS: 025
OD_CYLINDER: -0.25
OS_CYLINDER: -0.25

## 2024-12-07 ASSESSMENT — PACHYMETRY
OD_CT_UM: 567
OD_CT_CORRECTION: -1
OS_CT_CORRECTION: -2
OS_CT_UM: 575

## 2024-12-07 ASSESSMENT — REFRACTION_MANIFEST
OD_CYLINDER: -0.50
OD_SPHERE: +0.75
OS_SPHERE: +0.75
OS_VA1: 20/20-2
OD_AXIS: 090
OD_ADD: +1.75
OS_SPHERE: +1.75
OS_CYLINDER: SPJ
OS_VA1: 20/20
OD_VA1: 20/20-2
OD_VA1: 20/20
OD_SPHERE: +1.50
OD_AXIS: 071
OD_CYLINDER: -0.25
OD_ADD: +1.50
OS_AXIS: 010
OS_ADD: +1.50
OS_CYLINDER: -0.50
OS_ADD: +1.75

## 2024-12-07 ASSESSMENT — KERATOMETRY
OD_K2POWER_DIOPTERS: 44.00
OS_AXISANGLE_DEGREES: 104
OD_K1POWER_DIOPTERS: 43.75
OS_K1POWER_DIOPTERS: 43.75
METHOD_AUTO_MANUAL: AUTO
OD_AXISANGLE_DEGREES: 085
OS_K2POWER_DIOPTERS: 44.00

## 2024-12-07 ASSESSMENT — VISUAL ACUITY
OD_BCVA: 20/30+1
OS_BCVA: 20/20-1

## 2024-12-07 ASSESSMENT — TONOMETRY
OD_IOP_MMHG: 20
OS_IOP_MMHG: 17

## 2024-12-12 ENCOUNTER — APPOINTMENT (OUTPATIENT)
Dept: PULMONOLOGY | Facility: CLINIC | Age: 49
End: 2024-12-12
Payer: COMMERCIAL

## 2024-12-12 VITALS
HEART RATE: 80 BPM | DIASTOLIC BLOOD PRESSURE: 80 MMHG | RESPIRATION RATE: 16 BRPM | SYSTOLIC BLOOD PRESSURE: 122 MMHG | OXYGEN SATURATION: 98 % | BODY MASS INDEX: 31.82 KG/M2 | WEIGHT: 191 LBS | HEIGHT: 65 IN

## 2024-12-12 DIAGNOSIS — E66.9 OBESITY, UNSPECIFIED: ICD-10-CM

## 2024-12-12 DIAGNOSIS — G47.33 OBSTRUCTIVE SLEEP APNEA (ADULT) (PEDIATRIC): ICD-10-CM

## 2024-12-12 DIAGNOSIS — J30.9 ALLERGIC RHINITIS, UNSPECIFIED: ICD-10-CM

## 2024-12-12 DIAGNOSIS — J45.909 UNSPECIFIED ASTHMA, UNCOMPLICATED: ICD-10-CM

## 2024-12-12 PROCEDURE — 99213 OFFICE O/P EST LOW 20 MIN: CPT

## 2024-12-13 ENCOUNTER — OFFICE (OUTPATIENT)
Dept: URBAN - METROPOLITAN AREA CLINIC 102 | Facility: CLINIC | Age: 49
Setting detail: OPHTHALMOLOGY
End: 2024-12-13
Payer: MEDICAID

## 2024-12-13 DIAGNOSIS — H40.1124: ICD-10-CM

## 2024-12-13 PROCEDURE — 65855 TRABECULOPLASTY LASER SURG: CPT | Mod: 79,LT | Performed by: STUDENT IN AN ORGANIZED HEALTH CARE EDUCATION/TRAINING PROGRAM

## 2024-12-13 ASSESSMENT — REFRACTION_CURRENTRX
OD_SPHERE: +1.25
OD_SPHERE: +0.75
OD_VPRISM_DIRECTION: SV
OS_ADD: +1.75
OS_OVR_VA: 20/
OS_AXIS: 38
OS_CYLINDER: SPHERE
OD_SPHERE: +0.75
OS_VPRISM_DIRECTION: PROGS
OD_VPRISM_DIRECTION: PROGS
OD_OVR_VA: 20/
OS_SPHERE: +0.75
OS_CYLINDER: SPHERE
OS_VPRISM_DIRECTION: SV
OS_AXIS: 180
OD_AXIS: 091
OS_OVR_VA: 20/
OD_OVR_VA: 20/
OD_ADD: +1.75
OS_SPHERE: +0.75
OS_OVR_VA: 20/
OD_CYLINDER: -0.50
OD_VPRISM_DIRECTION: PROGS
OS_VPRISM_DIRECTION: PROGS
OD_AXIS: 0
OD_OVR_VA: 20/
OS_AXIS: 0
OS_CYLINDER: -0.25
OS_SPHERE: +0.75
OD_AXIS: 45
OD_ADD: +1.75
OD_CYLINDER: SPHERE
OD_CYLINDER: -0.50
OS_ADD: +1.75

## 2024-12-13 ASSESSMENT — REFRACTION_MANIFEST
OS_SPHERE: +0.75
OD_AXIS: 090
OS_ADD: +1.50
OD_ADD: +1.50
OD_VA1: 20/20
OS_CYLINDER: SPJ
OS_AXIS: 010
OD_AXIS: 071
OS_CYLINDER: -0.50
OS_VA1: 20/20-2
OD_ADD: +1.75
OD_CYLINDER: -0.25
OS_VA1: 20/20
OS_ADD: +1.75
OS_SPHERE: +1.75
OD_SPHERE: +0.75
OD_SPHERE: +1.50
OD_CYLINDER: -0.50
OD_VA1: 20/20-2

## 2024-12-13 ASSESSMENT — KERATOMETRY
METHOD_AUTO_MANUAL: AUTO
OS_K1POWER_DIOPTERS: 43.50
OS_K2POWER_DIOPTERS: 44.00
OD_AXISANGLE_DEGREES: 090
OS_AXISANGLE_DEGREES: 136
OD_K2POWER_DIOPTERS: 44.00
OD_K1POWER_DIOPTERS: 43.75

## 2024-12-13 ASSESSMENT — REFRACTION_AUTOREFRACTION
OD_AXIS: 093
OD_SPHERE: +2.00
OD_CYLINDER: -0.50
OS_SPHERE: +2.00
OS_AXIS: 076
OS_CYLINDER: -0.25

## 2024-12-13 ASSESSMENT — PACHYMETRY
OS_CT_UM: 575
OD_CT_CORRECTION: -1
OS_CT_CORRECTION: -2
OD_CT_UM: 567

## 2024-12-13 ASSESSMENT — LID EXAM ASSESSMENTS
OS_COMMENTS: EYELINER TATTOO
OD_COMMENTS: EYELINER TATTOO

## 2024-12-13 ASSESSMENT — TONOMETRY
OD_IOP_MMHG: 16
OS_IOP_MMHG: 16

## 2024-12-13 ASSESSMENT — PUNCTA - ASSESSMENT
OS_PUNCTA: SIL PLUG LLL
OD_PUNCTA: SIL PLUG RLL

## 2024-12-13 ASSESSMENT — VISUAL ACUITY
OS_BCVA: 20/50-2
OD_BCVA: 20/40

## 2024-12-13 ASSESSMENT — CONFRONTATIONAL VISUAL FIELD TEST (CVF)
OS_FINDINGS: FULL
OD_FINDINGS: FULL

## 2024-12-21 ENCOUNTER — OFFICE (OUTPATIENT)
Dept: URBAN - METROPOLITAN AREA CLINIC 102 | Facility: CLINIC | Age: 49
Setting detail: OPHTHALMOLOGY
End: 2024-12-21
Payer: MEDICAID

## 2024-12-21 DIAGNOSIS — H40.1134: ICD-10-CM

## 2024-12-21 PROCEDURE — 99024 POSTOP FOLLOW-UP VISIT: CPT | Performed by: STUDENT IN AN ORGANIZED HEALTH CARE EDUCATION/TRAINING PROGRAM

## 2024-12-21 ASSESSMENT — REFRACTION_CURRENTRX
OD_VPRISM_DIRECTION: PROGS
OS_SPHERE: +0.75
OS_CYLINDER: SPHERE
OD_OVR_VA: 20/
OS_SPHERE: +1.00
OD_OVR_VA: 20/
OS_ADD: +1.75
OS_CYLINDER: SPHERE
OS_VPRISM_DIRECTION: PROGS
OS_OVR_VA: 20/
OD_OVR_VA: 20/
OD_VPRISM_DIRECTION: PROGS
OD_ADD: +1.75
OD_CYLINDER: SPHERE
OD_CYLINDER: -0.50
OS_OVR_VA: 20/
OD_SPHERE: +0.75
OD_SPHERE: +0.75
OD_AXIS: 0
OD_AXIS: 180
OS_VPRISM_DIRECTION: PROGS
OS_AXIS: 0
OS_OVR_VA: 20/
OS_CYLINDER: -0.25
OD_ADD: +1.75
OS_AXIS: 180
OD_AXIS: 091
OD_CYLINDER: PLANO
OS_SPHERE: +0.75
OS_AXIS: 180
OS_ADD: +1.75
OS_VPRISM_DIRECTION: PROGS
OD_VPRISM_DIRECTION: PROGS
OD_SPHERE: +0.75

## 2024-12-21 ASSESSMENT — KERATOMETRY
OS_K1POWER_DIOPTERS: 43.50
OD_K2POWER_DIOPTERS: 44.00
OD_AXISANGLE_DEGREES: 085
METHOD_AUTO_MANUAL: AUTO
OD_K1POWER_DIOPTERS: 43.50
OS_AXISANGLE_DEGREES: 128
OS_K2POWER_DIOPTERS: 43.75

## 2024-12-21 ASSESSMENT — VISUAL ACUITY
OS_BCVA: 20/20
OD_BCVA: 20/40

## 2024-12-21 ASSESSMENT — REFRACTION_MANIFEST
OS_SPHERE: +1.75
OD_AXIS: 071
OS_AXIS: 010
OD_SPHERE: +1.50
OS_CYLINDER: -0.50
OD_VA1: 20/20
OS_VA1: 20/20-2
OS_ADD: +1.50
OD_CYLINDER: -0.50
OD_VA1: 20/20-2
OD_CYLINDER: -0.25
OS_SPHERE: +0.75
OS_VA1: 20/20
OD_SPHERE: +0.75
OD_AXIS: 090
OS_CYLINDER: SPJ
OD_ADD: +1.75
OD_ADD: +1.50
OS_ADD: +1.75

## 2024-12-21 ASSESSMENT — CONFRONTATIONAL VISUAL FIELD TEST (CVF)
OS_FINDINGS: FULL
OD_FINDINGS: FULL

## 2024-12-21 ASSESSMENT — REFRACTION_AUTOREFRACTION
OS_SPHERE: +2.50
OD_CYLINDER: -0.50
OD_AXIS: 096
OS_AXIS: 067
OD_SPHERE: +2.00
OS_CYLINDER: -0.25

## 2024-12-21 ASSESSMENT — PACHYMETRY
OD_CT_UM: 567
OS_CT_UM: 575
OD_CT_CORRECTION: -1
OS_CT_CORRECTION: -2

## 2024-12-21 ASSESSMENT — PUNCTA - ASSESSMENT
OD_PUNCTA: SIL PLUG RLL
OS_PUNCTA: SIL PLUG LLL

## 2024-12-21 ASSESSMENT — TONOMETRY
OS_IOP_MMHG: 15
OD_IOP_MMHG: 17

## 2024-12-21 ASSESSMENT — LID EXAM ASSESSMENTS
OD_COMMENTS: EYELINER TATTOO
OS_COMMENTS: EYELINER TATTOO

## 2025-01-22 ENCOUNTER — APPOINTMENT (OUTPATIENT)
Dept: PULMONOLOGY | Facility: CLINIC | Age: 50
End: 2025-01-22
Payer: COMMERCIAL

## 2025-01-22 VITALS
RESPIRATION RATE: 16 BRPM | WEIGHT: 191 LBS | DIASTOLIC BLOOD PRESSURE: 70 MMHG | SYSTOLIC BLOOD PRESSURE: 128 MMHG | HEART RATE: 68 BPM | BODY MASS INDEX: 31.82 KG/M2 | HEIGHT: 65 IN | OXYGEN SATURATION: 98 %

## 2025-01-22 DIAGNOSIS — J45.909 UNSPECIFIED ASTHMA, UNCOMPLICATED: ICD-10-CM

## 2025-01-22 PROCEDURE — G2211 COMPLEX E/M VISIT ADD ON: CPT

## 2025-01-22 PROCEDURE — 99214 OFFICE O/P EST MOD 30 MIN: CPT

## 2025-01-22 RX ORDER — FLUTICASONE PROPIONATE 50 UG/1
50 SPRAY, METERED NASAL DAILY
Qty: 1 | Refills: 5 | Status: ACTIVE | COMMUNITY
Start: 2025-01-22 | End: 1900-01-01

## 2025-01-22 RX ORDER — ALBUTEROL SULFATE 2.5 MG/3ML
(2.5 MG/3ML) SOLUTION RESPIRATORY (INHALATION)
Qty: 1 | Refills: 3 | Status: ACTIVE | COMMUNITY
Start: 2025-01-22 | End: 1900-01-01

## 2025-02-13 ENCOUNTER — RX CHANGE (OUTPATIENT)
Age: 50
End: 2025-02-13

## 2025-02-13 RX ORDER — FLUTICASONE PROPIONATE 50 UG/1
50 SPRAY, METERED NASAL
Qty: 48 | Refills: 2 | Status: ACTIVE | COMMUNITY
Start: 1900-01-01 | End: 1900-01-01

## 2025-05-02 ENCOUNTER — RX RENEWAL (OUTPATIENT)
Age: 50
End: 2025-05-02

## 2025-07-16 ENCOUNTER — APPOINTMENT (OUTPATIENT)
Dept: PULMONOLOGY | Facility: CLINIC | Age: 50
End: 2025-07-16
Payer: COMMERCIAL

## 2025-07-16 VITALS — WEIGHT: 188 LBS | BODY MASS INDEX: 31.71 KG/M2 | HEIGHT: 64.5 IN

## 2025-07-16 VITALS
SYSTOLIC BLOOD PRESSURE: 128 MMHG | RESPIRATION RATE: 16 BRPM | HEART RATE: 81 BPM | DIASTOLIC BLOOD PRESSURE: 72 MMHG | OXYGEN SATURATION: 98 %

## 2025-07-16 PROBLEM — R06.09 DYSPNEA ON EXERTION: Status: ACTIVE | Noted: 2025-07-16

## 2025-07-16 PROBLEM — R06.09 DYSPNEA ON EXERTION: Status: RESOLVED | Noted: 2023-12-07 | Resolved: 2025-07-16

## 2025-07-16 PROCEDURE — 94010 BREATHING CAPACITY TEST: CPT

## 2025-07-16 PROCEDURE — 99214 OFFICE O/P EST MOD 30 MIN: CPT | Mod: 25

## 2025-07-30 ENCOUNTER — APPOINTMENT (OUTPATIENT)
Dept: ULTRASOUND IMAGING | Facility: CLINIC | Age: 50
End: 2025-07-30
Payer: COMMERCIAL

## 2025-07-30 ENCOUNTER — APPOINTMENT (OUTPATIENT)
Dept: RADIOLOGY | Facility: CLINIC | Age: 50
End: 2025-07-30
Payer: COMMERCIAL

## 2025-07-30 ENCOUNTER — OUTPATIENT (OUTPATIENT)
Dept: OUTPATIENT SERVICES | Facility: HOSPITAL | Age: 50
LOS: 1 days | End: 2025-07-30
Payer: COMMERCIAL

## 2025-07-30 DIAGNOSIS — R06.09 OTHER FORMS OF DYSPNEA: ICD-10-CM

## 2025-07-30 DIAGNOSIS — Z98.891 HISTORY OF UTERINE SCAR FROM PREVIOUS SURGERY: Chronic | ICD-10-CM

## 2025-07-30 DIAGNOSIS — Z98.890 OTHER SPECIFIED POSTPROCEDURAL STATES: Chronic | ICD-10-CM

## 2025-07-30 PROCEDURE — 93970 EXTREMITY STUDY: CPT | Mod: 26

## 2025-07-30 PROCEDURE — 71046 X-RAY EXAM CHEST 2 VIEWS: CPT

## 2025-07-30 PROCEDURE — 71046 X-RAY EXAM CHEST 2 VIEWS: CPT | Mod: 26

## 2025-07-30 PROCEDURE — 93970 EXTREMITY STUDY: CPT

## 2025-08-17 ENCOUNTER — OFFICE (OUTPATIENT)
Dept: URBAN - METROPOLITAN AREA CLINIC 12 | Facility: CLINIC | Age: 50
Setting detail: OPHTHALMOLOGY
End: 2025-08-17
Payer: MEDICAID

## 2025-08-17 DIAGNOSIS — H40.1134: ICD-10-CM

## 2025-08-17 PROCEDURE — 92133 CPTRZD OPH DX IMG PST SGM ON: CPT | Performed by: STUDENT IN AN ORGANIZED HEALTH CARE EDUCATION/TRAINING PROGRAM

## 2025-08-17 PROCEDURE — 92014 COMPRE OPH EXAM EST PT 1/>: CPT | Performed by: STUDENT IN AN ORGANIZED HEALTH CARE EDUCATION/TRAINING PROGRAM

## 2025-08-17 PROCEDURE — 92083 EXTENDED VISUAL FIELD XM: CPT | Performed by: STUDENT IN AN ORGANIZED HEALTH CARE EDUCATION/TRAINING PROGRAM

## 2025-08-17 ASSESSMENT — REFRACTION_CURRENTRX
OS_OVR_VA: 20/
OD_VPRISM_DIRECTION: PROGS
OS_OVR_VA: 20/
OS_CYLINDER: SPHR
OD_CYLINDER: -0.50
OD_SPHERE: +0.75
OD_CYLINDER: SPHR
OD_SPHERE: +0.75
OD_VPRISM_DIRECTION: PROGS
OD_OVR_VA: 20/
OS_VPRISM_DIRECTION: PROGS
OS_VPRISM_DIRECTION: PROGS
OD_CYLINDER: SPHERE
OS_CYLINDER: SPHERE
OS_SPHERE: +0.75
OD_AXIS: 091
OD_SPHERE: +0.75
OD_AXIS: 0
OS_CYLINDER: SPHERE
OS_VPRISM_DIRECTION: PROGS
OS_AXIS: 180
OS_AXIS: 0
OS_OVR_VA: 20/
OS_ADD: +1.75
OD_ADD: +1.75
OS_ADD: +1.75
OD_ADD: +1.75
OS_SPHERE: +0.75
OD_OVR_VA: 20/
OS_SPHERE: +0.75
OD_OVR_VA: 20/
OS_ADD: +1.75
OD_ADD: +1.75
OD_VPRISM_DIRECTION: PROGS

## 2025-08-17 ASSESSMENT — REFRACTION_MANIFEST
OS_SPHERE: +1.75
OD_SPHERE: +0.75
OD_CYLINDER: -0.25
OS_SPHERE: +0.75
OD_ADD: +1.75
OD_ADD: +1.50
OS_ADD: +1.50
OD_AXIS: 090
OS_VA1: 20/20-2
OS_VA1: 20/20
OD_CYLINDER: -0.50
OS_ADD: +1.75
OD_AXIS: 071
OS_CYLINDER: SPJ
OS_AXIS: 010
OD_VA1: 20/20
OS_CYLINDER: -0.50
OD_SPHERE: +1.50
OD_VA1: 20/20-2

## 2025-08-17 ASSESSMENT — REFRACTION_AUTOREFRACTION
OD_AXIS: 092
OD_CYLINDER: -0.50
OS_CYLINDER: -0.25
OD_SPHERE: +2.25
OS_AXIS: 58
OS_SPHERE: +2.00

## 2025-08-17 ASSESSMENT — CONFRONTATIONAL VISUAL FIELD TEST (CVF)
OS_FINDINGS: FULL
OD_FINDINGS: FULL

## 2025-08-17 ASSESSMENT — KERATOMETRY
METHOD_AUTO_MANUAL: AUTO
OS_K1POWER_DIOPTERS: 44.00
OS_K2POWER_DIOPTERS: 44.00
OD_K2POWER_DIOPTERS: 43.75
OD_K1POWER_DIOPTERS: 43.50
OD_AXISANGLE_DEGREES: 089
OS_AXISANGLE_DEGREES: 090

## 2025-08-17 ASSESSMENT — PUNCTA - ASSESSMENT
OS_PUNCTA: SIL PLUG LLL
OD_PUNCTA: SIL PLUG RLL

## 2025-08-17 ASSESSMENT — PACHYMETRY
OD_CT_CORRECTION: -1
OD_CT_UM: 567
OS_CT_CORRECTION: -2
OS_CT_UM: 575

## 2025-08-17 ASSESSMENT — LID EXAM ASSESSMENTS
OD_COMMENTS: EYELINER TATTOO
OS_COMMENTS: EYELINER TATTOO

## 2025-08-17 ASSESSMENT — VISUAL ACUITY
OD_BCVA: 20/40-
OS_BCVA: 20/40

## 2025-09-10 ENCOUNTER — APPOINTMENT (OUTPATIENT)
Dept: PULMONOLOGY | Facility: CLINIC | Age: 50
End: 2025-09-10
Payer: COMMERCIAL

## 2025-09-10 VITALS
WEIGHT: 193 LBS | RESPIRATION RATE: 16 BRPM | OXYGEN SATURATION: 98 % | HEART RATE: 75 BPM | HEIGHT: 64.5 IN | SYSTOLIC BLOOD PRESSURE: 124 MMHG | BODY MASS INDEX: 32.55 KG/M2 | DIASTOLIC BLOOD PRESSURE: 73 MMHG

## 2025-09-10 DIAGNOSIS — J45.909 UNSPECIFIED ASTHMA, UNCOMPLICATED: ICD-10-CM

## 2025-09-10 DIAGNOSIS — E66.9 OBESITY, UNSPECIFIED: ICD-10-CM

## 2025-09-10 DIAGNOSIS — G47.33 OBSTRUCTIVE SLEEP APNEA (ADULT) (PEDIATRIC): ICD-10-CM

## 2025-09-10 PROCEDURE — 99213 OFFICE O/P EST LOW 20 MIN: CPT

## 2025-09-10 PROCEDURE — G2211 COMPLEX E/M VISIT ADD ON: CPT
